# Patient Record
Sex: MALE | Race: ASIAN
[De-identification: names, ages, dates, MRNs, and addresses within clinical notes are randomized per-mention and may not be internally consistent; named-entity substitution may affect disease eponyms.]

---

## 2017-09-07 ENCOUNTER — HOSPITAL ENCOUNTER (INPATIENT)
Dept: HOSPITAL 53 - M ED | Age: 37
LOS: 4 days | Discharge: HOME | DRG: 194 | End: 2017-09-11
Attending: GENERAL PRACTICE | Admitting: INTERNAL MEDICINE
Payer: SELF-PAY

## 2017-09-07 VITALS — DIASTOLIC BLOOD PRESSURE: 93 MMHG | SYSTOLIC BLOOD PRESSURE: 125 MMHG

## 2017-09-07 VITALS — WEIGHT: 168.21 LBS | HEIGHT: 71 IN | BODY MASS INDEX: 23.55 KG/M2

## 2017-09-07 VITALS — SYSTOLIC BLOOD PRESSURE: 133 MMHG | DIASTOLIC BLOOD PRESSURE: 94 MMHG

## 2017-09-07 VITALS — SYSTOLIC BLOOD PRESSURE: 112 MMHG | DIASTOLIC BLOOD PRESSURE: 78 MMHG

## 2017-09-07 DIAGNOSIS — F10.10: ICD-10-CM

## 2017-09-07 DIAGNOSIS — I34.0: ICD-10-CM

## 2017-09-07 DIAGNOSIS — I50.21: Primary | ICD-10-CM

## 2017-09-07 DIAGNOSIS — F17.200: ICD-10-CM

## 2017-09-07 DIAGNOSIS — J18.9: ICD-10-CM

## 2017-09-07 DIAGNOSIS — I47.2: ICD-10-CM

## 2017-09-07 LAB
ADD MANUAL DIFFER: NO
ALBUMIN SERPL BCG-MCNC: 3.3 GM/DL (ref 3.2–5.2)
ALBUMIN/GLOB SERPL: 1.03 {RATIO} (ref 1–1.93)
ALP SERPL-CCNC: 92 U/L (ref 45–117)
ALT SERPL W P-5'-P-CCNC: 62 U/L (ref 12–78)
ANION GAP SERPL CALC-SCNC: 11 MEQ/L (ref 8–16)
AST SERPL-CCNC: 23 U/L (ref 15–37)
BASOPHILS # BLD AUTO: 0 K/MM3 (ref 0–0.2)
BASOPHILS NFR BLD AUTO: 0.4 % (ref 0–1)
BILIRUB CONJ SERPL-MCNC: 0.8 MG/DL (ref 0–0.2)
BILIRUB SERPL-MCNC: 3.1 MG/DL (ref 0.2–1)
BUN SERPL-MCNC: 13 MG/DL (ref 7–18)
CALCIUM SERPL-MCNC: 8.5 MG/DL (ref 8.5–10.1)
CHLORIDE SERPL-SCNC: 108 MEQ/L (ref 98–107)
CO2 SERPL-SCNC: 23 MEQ/L (ref 21–32)
CREAT SERPL-MCNC: 1.15 MG/DL (ref 0.7–1.3)
DIFF SLIDE NUMBER: 234
EOSINOPHIL # BLD AUTO: 0.1 K/MM3 (ref 0–0.5)
EOSINOPHIL NFR BLD AUTO: 0.8 % (ref 0–3)
ERYTHROCYTE [DISTWIDTH] IN BLOOD BY AUTOMATED COUNT: 12.9 % (ref 11.5–14.5)
GFR SERPL CREATININE-BSD FRML MDRD: > 60 ML/MIN/{1.73_M2} (ref 60–?)
GLUCOSE SERPL-MCNC: 118 MG/DL (ref 70–105)
INR PPP: 1.11
LARGE UNSTAINED CELL #: 0.1 K/MM3 (ref 0–0.4)
LARGE UNSTAINED CELL %: 1 % (ref 0–4)
LYMPHOCYTES # BLD AUTO: 1.7 K/MM3 (ref 1.5–4.5)
LYMPHOCYTES NFR BLD AUTO: 14 % (ref 24–44)
MCH RBC QN AUTO: 33.1 PG (ref 27–33)
MCHC RBC AUTO-ENTMCNC: 33.4 G/DL (ref 32–36.5)
MCV RBC AUTO: 99.3 FL (ref 80–96)
MONOCYTES # BLD AUTO: 0.5 K/MM3 (ref 0–0.8)
MONOCYTES NFR BLD AUTO: 4.4 % (ref 0–5)
NEUTROPHILS # BLD AUTO: 9.4 K/MM3 (ref 1.8–7.7)
NEUTROPHILS NFR BLD AUTO: 79.4 % (ref 36–66)
PLATELET # BLD AUTO: 309 K/MM3 (ref 150–450)
POTASSIUM SERPL-SCNC: 4.4 MEQ/L (ref 3.5–5.1)
PROT SERPL-MCNC: 6.5 GM/DL (ref 6.4–8.2)
SODIUM SERPL-SCNC: 142 MEQ/L (ref 136–145)
T4 SERPL-MCNC: 10.6 UG/DL (ref 4.5–12)
WBC # BLD AUTO: 11.8 K/MM3 (ref 4–10)

## 2017-09-07 RX ADMIN — FUROSEMIDE SCH MG: 40 TABLET ORAL at 16:12

## 2017-09-07 RX ADMIN — DEXTROSE MONOHYDRATE SCH MLS/HR: 50 INJECTION, SOLUTION INTRAVENOUS at 16:12

## 2017-09-07 RX ADMIN — CEFTRIAXONE SCH MLS/HR: 1 INJECTION, POWDER, FOR SOLUTION INTRAMUSCULAR; INTRAVENOUS at 18:17

## 2017-09-07 NOTE — REP
Chest two views

 

HISTORY: Cough

 

Comparison: 07/03/2014

 

Patchy density is present in the right lower lobe consistent with atelectasis or

infiltrate.  The left lung is clear.  The heart is normal in size.  The pulmonary

vasculature is normal in appearance.  The bony structure is intact.

 

IMPRESSION:  Right lower lobe atelectasis or infiltrate.

 

 

Signed by

Rigo Talavera MD 09/07/2017 11:40 A

## 2017-09-07 NOTE — ECGEPIP
Stationary ECG Study

                           [facility] - ED

                                       

                                       Test Date:    2017

Pat Name:     FADI PAPPAS              Department:   

Patient ID:   [id number]                 Room:         -

Gender:       M                        Technician:   

:          1980               Requested By: NABOR LALA PA-C

Order Number: [id number]     Reading MD:   Chris Rao

                                 Measurements

Intervals                              Axis          

Rate:         124                      P:            35

[place]:           112                      QRS:          91

QRSD:         102                      T:            -37

QT:           337                                    

QTc:          484                                    

                           Interpretive Statements

SINUS TACHYCARDIA WITH SHORT [place] INTERVAL

LAE

BORDERLINE RIGHT AXIS DEVIATION

SEPTAL MYOCARDIAL INFARCTION, OF INDETERMINATE AGE

NO PRIORS

Electronically Signed On 2017 18:30:52 EDT by Chris Rao

## 2017-09-07 NOTE — HPEPDOC
General


Date of Admission


Sep 7, 2017 at 15:06


Attending Physician:  GAGE ALVAREZ MD


Chief Complaint


The patient is a 37-year-old male admitted with a reason for visit of 

Multifocal Pneumonia.





History of Present Illness


37-year-old male with no significant past medical history, and has not seen a 

primary care physician in the last 10+ years presents to the ER with a chief 

complaint of increased shortness of breath and cough productive of bloody 

sputum. The patient states that his symptoms began 6 weeks, when he noted 

feeling generalized fatigue and short of breath on exertion. However, over the 

last 2 weeks patient also began to notice lower extremity swelling. His 

symptoms culminated over the last 2 days when he developed a cough productive 

of bloody tinged sputum. The patient also states that he has been feeling 

increasingly short of breath when laying flat in bed. During this time, the 

patient denied any fevers, chills, chest pain, palpitations, abdominal pain, 

recent travel, sick contacts, or any nausea/vomiting/diarrhea. However, the 

patient does state that he does drink 1-2 shots of hard liquor and 4-5 beers 

about 4 times a week. In addition, the patient does state that his father has a 

history of congestive heart failure diagnosed at the age of 48. He also has a 

history of cardiac disease on his father's side.





In the ER, a CT of the chest revealed multifocal pneumonia. In addition, 

cardiomegaly could not be ruled out. However, the patient's lab work consisted 

of a BNP of nearly 1500+, and a physical exam suggestive of fluid overload (2+ 

lower extremity pitting edema, and faint bibasilar crackles). The patient will 

be admitted to the hospitalist service for further evaluation and management.





Home Medications


No Active Prescriptions or Reported Meds





Allergies


Coded Allergies:  


     No Known Drug Allergy (Verified  Allergy, Unknown, 9/12/14)





Past Medical History


Medical History


None


Surgical History


Tonsillectomy, hernia repair





Family History


Father diagnosed with congestive heart failure in 2001, at age 48. Grandfather 

also had cardiac disease history in addition to other members on father's side.





Social History


* Smoker:  other (patient states that he has smoked about half a pack to pack a 

day of tobacco for the past 10+ years.)


Alcohol:  other (as noted in HPI.)


Drugs:  denies


Recent Travel/Sick Contacts:  Denies: Recent travel, Recent sick contacts





Review of Symptoms


Other systems


10 point review of systems negative unless otherwise specified in HPI.





Physical Examination


General Exam:  Positive: Alert, Cooperative, No Acute Distress


ENT Exam:  Positive: Atraumatic, Mucous membr. moist/pink


Neck Exam:  Negative: JVD


Chest Exam:  Positive: Rales (faint bibasilar rales noted on auscultation)


Heart Exam:  Positive: Tachycardic, Normal S1, Normal S2


Telemetry:  Positive: Sinus


Abdomen Exam:  Positive: Soft, 


   Negative: Tenderness


Extremity Exam:  Positive: Swelling (2+ pitting edema noted from his lower 

extremities bilaterally.), 


   Negative: Tenderness


Psych Exam:  Positive: Oriented x 3





Vital Signs





Vital Signs








  Date Time  Temp Pulse Resp B/P (MAP) Pulse Ox O2 Delivery O2 Flow Rate FiO2


 


9/7/17 10:56        


 


9/7/17 10:46 97.2 128 19  100 Room Air  











Laboratory Data


Labs 24H


Laboratory Tests 2


9/7/17 11:22: 


White Blood Count 11.8H, Red Blood Count 5.13, Hemoglobin 17.0, Hematocrit 51.0

, Mean Corpuscular Volume 99.3H, Mean Corpuscular Hemoglobin 33.1H, Mean 

Corpuscular Hemoglobin Concent 33.4, Red Cell Distribution Width 12.9, Platelet 

Count 309, Neutrophils (%) (Auto) 79.4H, Lymphocytes (%) (Auto) 14.0L, 

Monocytes (%) (Auto) 4.4, Eosinophils (%) (Auto) 0.8, Basophils (%) (Auto) 0.4, 

Neutrophils # (Auto) 9.4H, Lymphocytes # (Auto) 1.7, Monocytes # (Auto) 0.5, 

Eosinophils # (Auto) 0.1, Basophils # (Auto) 0.0, Large Unclassified Cells % 1.0

, Large Unclassified Cells # 0.1, Prothrombin Time 14.5, Prothromb Time 

International Ratio 1.11, Anion Gap 11, Glomerular Filtration Rate > 60.0, 

Lactic Acid Level 2.5*H, Calcium Level 8.5, Aspartate Amino Transf (AST/SGOT) 23

, Alanine Aminotransferase (ALT/SGPT) 62, Alkaline Phosphatase 92, Total 

Bilirubin 3.1H, Direct Bilirubin 0.8H, Total Creatine Kinase 76, Creatine 

Kinase MB 2.1, Creatine Kinase MB Relative Index 2.76, Troponin I 0.02, B-Type 

Natriuretic Peptide 1480H, Total Protein 6.5, Albumin 3.3, Albumin/Globulin 

Ratio 1.03, Thyroid Stimulating Hormone (TSH) 1.640, Thyroxine (T4) 10.6


9/7/17 13:53: 


Urine Appearance CLEAR, Urine Color YELLOW, Urine pH 5.0, Urine Specific 

Gravity 1.044, Urine Protein 1+H, Urine Glucose (UA) NEGATIVE, Urine Ketones 

NEGATIVE, Urine Urobilinogen 0.2, Urine Bilirubin NEGATIVE, Urine Leukocyte 

Esterase NEGATIVE, Urine Blood NEGATIVE, Urine Nitrite NEGATIVE, Urine WBC (Auto

) 0, Urine RBC (Auto) 0, Urine Hyaline Casts (Auto) 0, Urine Bacteria (Auto) 

NEGATIVE, Urine Squamous Epithelial Cells 0, Urine Mucus (Auto) SMALL, Urine 

Sperm (Auto) 


9/7/17 15:26: 


CBC/BMP


Laboratory Tests


9/7/17 11:22








Red Blood Count 5.13, Mean Corpuscular Volume 99.3 H, Mean Corpuscular 

Hemoglobin 33.1 H, Mean Corpuscular Hemoglobin Concent 33.4, Red Cell 

Distribution Width 12.9, Neutrophils (%) (Auto) 79.4 H, Lymphocytes (%) (Auto) 

14.0 L, Monocytes (%) (Auto) 4.4, Eosinophils (%) (Auto) 0.8, Basophils (%) (

Auto) 0.4, Neutrophils # (Auto) 9.4 H, Lymphocytes # (Auto) 1.7, Monocytes # (

Auto) 0.5, Eosinophils # (Auto) 0.1, Basophils # (Auto) 0.0


Microbiology





Microbiology


9/7/17 Blood Culture, Received


         Pending





Plan / VTE


VTE Prophylaxis Ordered?:  Yes





Plan


Plan


Multifocal Community-acquired pneumonia


CTA of the chest noted


Sputum cultures, MRSA screen, respiratory panel, urine Legionella/strep 

screening ordered


Blood cultures ordered


We will start the patient on Rocephin and azithromycin


We will continue to monitor the patient's clinical status





Possible underlying CHF


The patient does have evidence of decompensated congestive heart failure on 

physical exam, as well as his clinical presentation.


The patient does have risk factors which include family history of early onset 

cardiac disease, alcohol abuse, and history of tobacco use


I will start him on a dose of Lasix 40 mg daily, which can be uptitrated as 

tolerated


EKG in the ER reveals sinus tachycardia and nonspecific ST changes


Initial troponin negative, we will serially trend


2-D echocardiogram ordered


We will continue to monitor the patient on telemetry for now





Alcohol use


The patient states that he drinks 1-2 shots of hard liquor, with 4-5 beers most 

days of the week.


His last alcoholic beverage was 24 hours ago, the patient denies any signs/

symptoms or history of alcohol withdrawal


We will add when necessary Serax





DVT prophylaxis


Lovenox





The patient will be admitted under the service of Dr. Alvarez, who will begin 

to follow the patient on 9/8/17 at 7 AM.











SIMONE SAWANT MD Sep 7, 2017 16:00

## 2017-09-07 NOTE — REP
Clinical:  Shortness of breath .

 

Technique:  Axial contrast enhanced images from the thoracic inlet to the upper

abdomen using 100 ml Isovue 370 intravenous contrast material with multiplanar

re-formations.

 

Findings:  Satisfactory enhancement of the pulmonary vasculature is achieved and

no filling defects are identified to suggest pulmonary embolus.  Further

evaluation of the mediastinum cannot exclude cardiomegaly which may warrant

further investigation.  The bilateral lung fields demonstrate bilateral scattered

alveolar infiltrates suggesting multifocal pneumonia.  Associated mediastinal and

hilar adenopathy is identified and likely reactive.  Tracheobronchial tree is

patent.  No pleural effusion or pneumothorax.  Surrounding musculoskeletal

structures are intact.

 

Impression:

1.  No evidence for pulmonary embolus.

2.  Moderate bilateral diffuse alveolar infiltrates and adenopathy suggest

multifocal pneumonia and follow up to resolution is recommended.

3.  Cannot exclude cardiomegaly for which consultation may be advised.

 

 

Signed by

Pasquale Friedman MD 09/07/2017 01:35 P

## 2017-09-08 VITALS — DIASTOLIC BLOOD PRESSURE: 89 MMHG | SYSTOLIC BLOOD PRESSURE: 131 MMHG

## 2017-09-08 VITALS — DIASTOLIC BLOOD PRESSURE: 73 MMHG | SYSTOLIC BLOOD PRESSURE: 123 MMHG

## 2017-09-08 VITALS — DIASTOLIC BLOOD PRESSURE: 76 MMHG | SYSTOLIC BLOOD PRESSURE: 125 MMHG

## 2017-09-08 VITALS — DIASTOLIC BLOOD PRESSURE: 94 MMHG | SYSTOLIC BLOOD PRESSURE: 132 MMHG

## 2017-09-08 VITALS — DIASTOLIC BLOOD PRESSURE: 83 MMHG | HEART RATE: 113 BPM | SYSTOLIC BLOOD PRESSURE: 130 MMHG

## 2017-09-08 VITALS — SYSTOLIC BLOOD PRESSURE: 123 MMHG | DIASTOLIC BLOOD PRESSURE: 73 MMHG

## 2017-09-08 VITALS — DIASTOLIC BLOOD PRESSURE: 87 MMHG | SYSTOLIC BLOOD PRESSURE: 133 MMHG

## 2017-09-08 VITALS — SYSTOLIC BLOOD PRESSURE: 124 MMHG | DIASTOLIC BLOOD PRESSURE: 76 MMHG

## 2017-09-08 VITALS — DIASTOLIC BLOOD PRESSURE: 83 MMHG | SYSTOLIC BLOOD PRESSURE: 130 MMHG

## 2017-09-08 LAB
ALBUMIN SERPL BCG-MCNC: 3 GM/DL (ref 3.2–5.2)
ALBUMIN/GLOB SERPL: 0.94 {RATIO} (ref 1–1.93)
ALP SERPL-CCNC: 76 U/L (ref 45–117)
ALT SERPL W P-5'-P-CCNC: 47 U/L (ref 12–78)
ANION GAP SERPL CALC-SCNC: 10 MEQ/L (ref 8–16)
ANION GAP SERPL CALC-SCNC: 13 MEQ/L (ref 8–16)
AST SERPL-CCNC: 15 U/L (ref 15–37)
BASOPHILS # BLD AUTO: 0.1 K/MM3 (ref 0–0.2)
BASOPHILS NFR BLD AUTO: 0.6 % (ref 0–1)
BILIRUB SERPL-MCNC: 2.4 MG/DL (ref 0.2–1)
BUN SERPL-MCNC: 14 MG/DL (ref 7–18)
BUN SERPL-MCNC: 17 MG/DL (ref 7–18)
CALCIUM SERPL-MCNC: 8.4 MG/DL (ref 8.5–10.1)
CALCIUM SERPL-MCNC: 8.7 MG/DL (ref 8.5–10.1)
CHLORIDE SERPL-SCNC: 103 MEQ/L (ref 98–107)
CHLORIDE SERPL-SCNC: 107 MEQ/L (ref 98–107)
CHOLEST SERPL-MCNC: 165 MG/DL (ref ?–200)
CO2 SERPL-SCNC: 21 MEQ/L (ref 21–32)
CO2 SERPL-SCNC: 24 MEQ/L (ref 21–32)
CREAT SERPL-MCNC: 1.15 MG/DL (ref 0.7–1.3)
CREAT SERPL-MCNC: 1.26 MG/DL (ref 0.7–1.3)
EOSINOPHIL # BLD AUTO: 0.1 K/MM3 (ref 0–0.5)
EOSINOPHIL NFR BLD AUTO: 0.9 % (ref 0–3)
ERYTHROCYTE [DISTWIDTH] IN BLOOD BY AUTOMATED COUNT: 13.2 % (ref 11.5–14.5)
GFR SERPL CREATININE-BSD FRML MDRD: > 60 ML/MIN/{1.73_M2} (ref 60–?)
GFR SERPL CREATININE-BSD FRML MDRD: > 60 ML/MIN/{1.73_M2} (ref 60–?)
GLUCOSE SERPL-MCNC: 162 MG/DL (ref 70–105)
GLUCOSE SERPL-MCNC: 93 MG/DL (ref 70–105)
LARGE UNSTAINED CELL #: 0.1 K/MM3 (ref 0–0.4)
LARGE UNSTAINED CELL %: 1.3 % (ref 0–4)
LYMPHOCYTES # BLD AUTO: 2.9 K/MM3 (ref 1.5–4.5)
LYMPHOCYTES NFR BLD AUTO: 25.7 % (ref 24–44)
MAGNESIUM SERPL-MCNC: 1.9 MG/DL (ref 1.8–2.4)
MAGNESIUM SERPL-MCNC: 2 MG/DL (ref 1.8–2.4)
MAGNESIUM SERPL-MCNC: 2 MG/DL (ref 1.8–2.4)
MCH RBC QN AUTO: 32.3 PG (ref 27–33)
MCHC RBC AUTO-ENTMCNC: 32.2 G/DL (ref 32–36.5)
MCV RBC AUTO: 100.4 FL (ref 80–96)
MONOCYTES # BLD AUTO: 0.6 K/MM3 (ref 0–0.8)
MONOCYTES NFR BLD AUTO: 5.9 % (ref 0–5)
NEUTROPHILS # BLD AUTO: 7 K/MM3 (ref 1.8–7.7)
NEUTROPHILS NFR BLD AUTO: 65.6 % (ref 36–66)
PLATELET # BLD AUTO: 269 K/MM3 (ref 150–450)
POTASSIUM SERPL-SCNC: 4.3 MEQ/L (ref 3.5–5.1)
POTASSIUM SERPL-SCNC: 4.5 MEQ/L (ref 3.5–5.1)
PROT SERPL-MCNC: 6.2 GM/DL (ref 6.4–8.2)
SODIUM SERPL-SCNC: 137 MEQ/L (ref 136–145)
SODIUM SERPL-SCNC: 141 MEQ/L (ref 136–145)
T3RU NFR SERPL: 40 % (ref 33–40)
T4 SERPL-MCNC: 8.2 UG/DL (ref 4.5–12)
TRIGL SERPL-MCNC: 106 MG/DL (ref ?–150)
WBC # BLD AUTO: 10.6 K/MM3 (ref 4–10)

## 2017-09-08 RX ADMIN — CEFTRIAXONE SCH MLS/HR: 1 INJECTION, POWDER, FOR SOLUTION INTRAMUSCULAR; INTRAVENOUS at 17:10

## 2017-09-08 RX ADMIN — ENOXAPARIN SODIUM SCH MG: 40 INJECTION SUBCUTANEOUS at 09:15

## 2017-09-08 RX ADMIN — SODIUM CHLORIDE, PRESERVATIVE FREE SCH ML: 5 INJECTION INTRAVENOUS at 11:28

## 2017-09-08 RX ADMIN — FUROSEMIDE SCH MG: 10 INJECTION, SOLUTION INTRAMUSCULAR; INTRAVENOUS at 15:00

## 2017-09-08 RX ADMIN — ALBUTEROL SULFATE PRN MG: 2.5 SOLUTION RESPIRATORY (INHALATION) at 13:54

## 2017-09-08 RX ADMIN — CEFTRIAXONE SCH MLS/HR: 1 INJECTION, POWDER, FOR SOLUTION INTRAMUSCULAR; INTRAVENOUS at 05:06

## 2017-09-08 RX ADMIN — FUROSEMIDE SCH MG: 10 INJECTION, SOLUTION INTRAMUSCULAR; INTRAVENOUS at 20:49

## 2017-09-08 RX ADMIN — ALBUTEROL SULFATE PRN MG: 2.5 SOLUTION RESPIRATORY (INHALATION) at 19:33

## 2017-09-08 RX ADMIN — DEXTROSE MONOHYDRATE SCH MLS/HR: 50 INJECTION, SOLUTION INTRAVENOUS at 16:15

## 2017-09-08 RX ADMIN — FUROSEMIDE SCH MG: 40 TABLET ORAL at 09:13

## 2017-09-08 NOTE — REP
Clinical:  Chest pain

 

Comparison:  09/07/2017 .

 

Findings:

The mediastinum and cardiac silhouette are stable and within normal limits for

portable technique.  The lung fields are relatively stable and the previously

noted subtle alveolar infiltrates cannot be excluded.  No effusion or

pneumothorax.  Skeletal structures are intact.

 

Impression:

Subtle alveolar infiltrates primarily within the right lower lobe cannot be

excluded.

 

 

Signed by

Pasquale Freidman MD 09/08/2017 11:33 A

## 2017-09-08 NOTE — ECGEPIP
Stationary ECG Study

                              [facility]

                                       

                                       Test Date:    2017

Pat Name:     FADI PAPPAS              Department:   

Patient ID:   [id number]                 Room:         [place] [address]

Gender:       M                        Technician:   ADITYA

:          1980               Requested By: GAGE BEJARANO

Order Number: [id number]     Reading MD:   Daniel Real

                                 Measurements

Intervals                              Axis          

Rate:         113                      P:            48

ND:           120                      QRS:          100

QRSD:         102                      T:            -83

QT:           330                                    

QTc:          453                                    

                           Interpretive Statements

Sinus tachycardia

Biatrial enlargement

Low QRS complex voltage in the limb leads

Anteroseptal MI, age indeterminate

Nonspecific ST-T wave abnormalities

No significant change when compared to prior tracing of earlier this date

Electronically Signed On 2017 16:48:51 EDT by Daniel Real

## 2017-09-08 NOTE — ECGEPIP
Stationary ECG Study

                              [facility]

                                       

                                       Test Date:    2017

Pat Name:     FADI PAPPAS              Department:   

Patient ID:   [id number]                 Room:         [place] [address]

Gender:       M                        Technician:   

:          1980               Requested By: SIMONE SAWANT 

Order Number: [id number]     Reading MD:   Daniel Real

                                 Measurements

Intervals                              Axis          

Rate:         104                      P:            52

DC:           147                      QRS:          99

QRSD:         102                      T:            -86

QT:           362                                    

QTc:          477                                    

                           Interpretive Statements

Sinus tachycardia with PVCs

Left atrial enlargement

Low QRS complex voltage in the limb leads

Delayed anterior R wave progression consistent with prior AWMI

Nonspecific ST-T wave abnormalities

Compared to prior tracing of 2017, PVCs are new

 

Electronically Signed On 2017 16:42:47 EDT by Daniel Real

## 2017-09-09 VITALS — SYSTOLIC BLOOD PRESSURE: 111 MMHG | DIASTOLIC BLOOD PRESSURE: 79 MMHG

## 2017-09-09 VITALS — DIASTOLIC BLOOD PRESSURE: 82 MMHG | SYSTOLIC BLOOD PRESSURE: 112 MMHG

## 2017-09-09 VITALS — SYSTOLIC BLOOD PRESSURE: 118 MMHG | DIASTOLIC BLOOD PRESSURE: 71 MMHG

## 2017-09-09 VITALS — DIASTOLIC BLOOD PRESSURE: 88 MMHG | SYSTOLIC BLOOD PRESSURE: 118 MMHG

## 2017-09-09 VITALS — DIASTOLIC BLOOD PRESSURE: 88 MMHG | SYSTOLIC BLOOD PRESSURE: 126 MMHG

## 2017-09-09 VITALS — DIASTOLIC BLOOD PRESSURE: 64 MMHG | SYSTOLIC BLOOD PRESSURE: 130 MMHG

## 2017-09-09 VITALS — SYSTOLIC BLOOD PRESSURE: 106 MMHG | DIASTOLIC BLOOD PRESSURE: 67 MMHG

## 2017-09-09 LAB
ALBUMIN SERPL BCG-MCNC: 2.8 GM/DL (ref 3.2–5.2)
ALBUMIN/GLOB SERPL: 0.85 {RATIO} (ref 1–1.93)
ALP SERPL-CCNC: 70 U/L (ref 45–117)
ALT SERPL W P-5'-P-CCNC: 43 U/L (ref 12–78)
ANION GAP SERPL CALC-SCNC: 10 MEQ/L (ref 8–16)
ANION GAP SERPL CALC-SCNC: 12 MEQ/L (ref 8–16)
AST SERPL-CCNC: 20 U/L (ref 15–37)
BASOPHILS # BLD AUTO: 0.1 K/MM3 (ref 0–0.2)
BASOPHILS NFR BLD AUTO: 1.1 % (ref 0–1)
BILIRUB SERPL-MCNC: 1.5 MG/DL (ref 0.2–1)
BUN SERPL-MCNC: 15 MG/DL (ref 7–18)
BUN SERPL-MCNC: 16 MG/DL (ref 7–18)
CALCIUM SERPL-MCNC: 8.6 MG/DL (ref 8.5–10.1)
CALCIUM SERPL-MCNC: 8.8 MG/DL (ref 8.5–10.1)
CHLORIDE SERPL-SCNC: 104 MEQ/L (ref 98–107)
CHLORIDE SERPL-SCNC: 106 MEQ/L (ref 98–107)
CO2 SERPL-SCNC: 24 MEQ/L (ref 21–32)
CO2 SERPL-SCNC: 25 MEQ/L (ref 21–32)
CREAT SERPL-MCNC: 1.02 MG/DL (ref 0.7–1.3)
CREAT SERPL-MCNC: 1.04 MG/DL (ref 0.7–1.3)
EOSINOPHIL # BLD AUTO: 0.1 K/MM3 (ref 0–0.5)
EOSINOPHIL NFR BLD AUTO: 0.9 % (ref 0–3)
ERYTHROCYTE [DISTWIDTH] IN BLOOD BY AUTOMATED COUNT: 13.2 % (ref 11.5–14.5)
GFR SERPL CREATININE-BSD FRML MDRD: > 60 ML/MIN/{1.73_M2} (ref 60–?)
GFR SERPL CREATININE-BSD FRML MDRD: > 60 ML/MIN/{1.73_M2} (ref 60–?)
GGT SERPL-CCNC: 182 U/L (ref 15–85)
GLUCOSE SERPL-MCNC: 101 MG/DL (ref 70–105)
GLUCOSE SERPL-MCNC: 101 MG/DL (ref 70–105)
LARGE UNSTAINED CELL #: 0.2 K/MM3 (ref 0–0.4)
LARGE UNSTAINED CELL %: 1.8 % (ref 0–4)
LYMPHOCYTES # BLD AUTO: 2.8 K/MM3 (ref 1.5–4.5)
LYMPHOCYTES NFR BLD AUTO: 26.9 % (ref 24–44)
MAGNESIUM SERPL-MCNC: 1.9 MG/DL (ref 1.8–2.4)
MAGNESIUM SERPL-MCNC: 2 MG/DL (ref 1.8–2.4)
MCH RBC QN AUTO: 32.6 PG (ref 27–33)
MCHC RBC AUTO-ENTMCNC: 33.2 G/DL (ref 32–36.5)
MCV RBC AUTO: 98.2 FL (ref 80–96)
MONOCYTES # BLD AUTO: 0.7 K/MM3 (ref 0–0.8)
MONOCYTES NFR BLD AUTO: 6.8 % (ref 0–5)
NEUTROPHILS # BLD AUTO: 6 K/MM3 (ref 1.8–7.7)
NEUTROPHILS NFR BLD AUTO: 62.7 % (ref 36–66)
PLATELET # BLD AUTO: 239 K/MM3 (ref 150–450)
POTASSIUM SERPL-SCNC: 3.7 MEQ/L (ref 3.5–5.1)
POTASSIUM SERPL-SCNC: 4.7 MEQ/L (ref 3.5–5.1)
PROT SERPL-MCNC: 6.1 GM/DL (ref 6.4–8.2)
SODIUM SERPL-SCNC: 140 MEQ/L (ref 136–145)
SODIUM SERPL-SCNC: 141 MEQ/L (ref 136–145)
WBC # BLD AUTO: 9.6 K/MM3 (ref 4–10)

## 2017-09-09 RX ADMIN — ALBUTEROL SULFATE PRN MG: 2.5 SOLUTION RESPIRATORY (INHALATION) at 07:26

## 2017-09-09 RX ADMIN — SACUBITRIL AND VALSARTAN SCH TAB: 24; 26 TABLET, FILM COATED ORAL at 21:12

## 2017-09-09 RX ADMIN — SODIUM CHLORIDE, PRESERVATIVE FREE SCH ML: 5 INJECTION INTRAVENOUS at 00:04

## 2017-09-09 RX ADMIN — FUROSEMIDE SCH MG: 10 INJECTION, SOLUTION INTRAMUSCULAR; INTRAVENOUS at 14:46

## 2017-09-09 RX ADMIN — CEFTRIAXONE SCH MLS/HR: 1 INJECTION, POWDER, FOR SOLUTION INTRAMUSCULAR; INTRAVENOUS at 17:10

## 2017-09-09 RX ADMIN — ENOXAPARIN SODIUM SCH MG: 40 INJECTION SUBCUTANEOUS at 08:40

## 2017-09-09 RX ADMIN — ALBUTEROL SULFATE PRN MG: 2.5 SOLUTION RESPIRATORY (INHALATION) at 03:46

## 2017-09-09 RX ADMIN — SODIUM CHLORIDE, PRESERVATIVE FREE SCH ML: 5 INJECTION INTRAVENOUS at 21:12

## 2017-09-09 RX ADMIN — ALBUTEROL SULFATE PRN MG: 2.5 SOLUTION RESPIRATORY (INHALATION) at 00:13

## 2017-09-09 RX ADMIN — SODIUM CHLORIDE, PRESERVATIVE FREE SCH ML: 5 INJECTION INTRAVENOUS at 05:30

## 2017-09-09 RX ADMIN — ALBUTEROL SULFATE PRN MG: 2.5 SOLUTION RESPIRATORY (INHALATION) at 14:38

## 2017-09-09 RX ADMIN — ALBUTEROL SULFATE PRN MG: 2.5 SOLUTION RESPIRATORY (INHALATION) at 19:47

## 2017-09-09 RX ADMIN — CEFTRIAXONE SCH MLS/HR: 1 INJECTION, POWDER, FOR SOLUTION INTRAMUSCULAR; INTRAVENOUS at 04:49

## 2017-09-09 RX ADMIN — Medication SCH MG: at 08:40

## 2017-09-09 RX ADMIN — ALBUTEROL SULFATE PRN MG: 2.5 SOLUTION RESPIRATORY (INHALATION) at 23:52

## 2017-09-09 RX ADMIN — SACUBITRIL AND VALSARTAN SCH TAB: 24; 26 TABLET, FILM COATED ORAL at 14:55

## 2017-09-09 RX ADMIN — SPIRONOLACTONE SCH MG: 25 TABLET, FILM COATED ORAL at 12:45

## 2017-09-09 RX ADMIN — FUROSEMIDE SCH MG: 10 INJECTION, SOLUTION INTRAMUSCULAR; INTRAVENOUS at 08:37

## 2017-09-09 RX ADMIN — ALBUTEROL SULFATE PRN MG: 2.5 SOLUTION RESPIRATORY (INHALATION) at 11:07

## 2017-09-09 RX ADMIN — FOLIC ACID SCH MG: 1 TABLET ORAL at 08:40

## 2017-09-09 RX ADMIN — MULTIPLE VITAMINS W/ MINERALS TAB SCH TAB: TAB at 08:40

## 2017-09-09 RX ADMIN — FUROSEMIDE SCH MG: 10 INJECTION, SOLUTION INTRAMUSCULAR; INTRAVENOUS at 03:00

## 2017-09-09 RX ADMIN — SODIUM CHLORIDE, PRESERVATIVE FREE SCH ML: 5 INJECTION INTRAVENOUS at 14:56

## 2017-09-09 NOTE — ECHO
DATE OF PROCEDURE:  09/08/2017

 

YOB: 1980

AGE:  37

 

REFERRING PROVIDER:  Dr. Aleshia Alvarez

PATIENT LOCATION:  Room 3211

REASON FOR THE ECHOCARDIOGRAM:  Shortness of breath, nonsustained ventricular

tachycardia.

 

2D MEASUREMENTS:

IVS:  0.8 cm

LV:  6.6 cm

LVPW:  0.6 cm

LA:  4.6 cm

Aorta:  2.9 cm

IVC:  2.5 cm

RV:  2.6 cm

Ascending aorta:  2.6 cm

 

DOPPLER MEASUREMENTS:

Mitral E:  0.59

Mitral A:  0.37

Maximum tricuspid valve velocity:  3.1 m/s

 

2D COMMENTS:

1. Moderately enlarged left ventricle with severe diffuse hypokinesis and a

severely depressed global left ventricular systolic function. Left ventricular

systolic ejection fraction is estimated at 10-20%.

 

2. Mildly dilated left atrium. The right atrium may be mildly enlarged. Normal

right ventricle.

 

3. The atrial septum appeared to be normal without evidence of defect or shunt.

 

4. Normal aortic root.

 

5. Trace pericardial effusion noted, no evidence of cardiac tamponade.

 

6. Normal mitral valve. Minimally calcified aortic valve with normal leaflet

excursion. Normal tricuspid valve and pulmonic valve. The proximal pulmonary

artery branches appear to be normal.

 

7. The inferior vena cava was dilated, central venous pressure is most likely

elevated.

 

DOPPLER:  

It detects mild to moderate mitral regurgitation, moderate tricuspid

regurgitation and trace pulmonic regurgitation. The calculated pulmonary artery

systolic pressure varied between 40 to 50 mmHg.

 

IMPRESSION:

1. Severe left ventricular systolic dysfunction with severe diffuse hypokinesis

and a moderately enlarged left ventricle.

 

2. Aortic valve sclerosis without stenosis or aortic regurgitation.

 

3. Mild to moderate mitral regurgitation with mildly enlarged left atrium.

 

4. Moderate tricuspid regurgitation with moderate pulmonary hypertension and

probably mildly enlarged right atrium.

 

5. There are findings consistent with elevated central venous pressure.

 

6. The case was discussed with his primary hospitalist.

SALVADOR

## 2017-09-09 NOTE — REP
Clinical:  Shortness of breath.

 

Comparison:  09/08/2017.

 

Findings:  Mediastinum and cardiac silhouette are within normal limits.  A subtle

right lower lobe infiltrate cannot be excluded as well as subtle alveolar

infiltrate involving the right upper lung zone and left perihilar region.

Clinical correlation is recommended.  No effusion.  No pneumothorax.  Skeletal

structures intact.

 

Impression:

Subtle scattered opacities suggesting multifocal infiltrates similar to prior

examination.

 

 

Signed by

Pasquale Friedman MD 09/09/2017 08:21 A

## 2017-09-09 NOTE — CR
DATE OF CONSULTATION: 09/09/2017

 

REFERRING PROVIDER: Dr. Aleshia Alvarez.

 

REASON FOR CONSULTATION: Cardiomyopathy.

 

HISTORY OF PRESENT ILLNESS:

A 37-year-old  male was admitted on 09/70/2017 with increasing 
shortness

of breath, cough and pedal edema. He was being treated for pneumonia, he was

found to have bilateral pulmonary infiltrates. He was being monitored in the

progressive care unit (PCU) on intravenous (IV) antibiotics and he was found on

telemetry to have nonsustained ventricular tachycardia. He underwent an

echocardiogram and this was done this morning. It revealed a severely depressed

global left ventricular systolic function. Cardiology consult was called.

 

When I saw Mr. Seun Cobb, he was sitting up in bed in no acute 
distress

at rest. He has been having shortness of breath for the past four weeks, and 
this

has increased in severity and he had developed bilateral pedal edema, orthopnea.

He denies any palpitations but does complain that his heart goes fast. He denies

any fever or chills. He has no nausea, vomiting, diarrhea, melena or 
hematemesis.

He stated that his pedal edema has improved significantly and almost gone. His

cough also has improved significantly, and he is able to sleep better. He thinks

that he his having much less orthopnea. Since hospitalization, he has been on 
IV Lasix. 

He denies any nausea, vomiting, diarrhea, melena or hematemesis. 

He has no dysuria, polyuria, or hematuria. He has no focal manifestation.

 

There is no prior history of hypertension, hyperlipidemia, diabetes mellitus,

kidney disease, thyroid disorders. There is no history of coronary artery

disease, prior history of cardiomyopathy, prior history of pneumonia, 
significant

valvular heart disease, atrial fibrillation, sudden cardiac death.

 

PAST MEDICAL HISTORY:

Positive for tonsillectomy and hernia repair many years ago; otherwise,

unremarkable.

 

FAMILY HISTORY:

Positive for heart disease. His father was diagnosed in his 30s with congestive

heart failure and was found to have a weak heart, but on treatment, his heart

function has been lately normal.

 

SOCIAL HISTORY:

The patient lives with his wife and he has little children who live in the area.

Prior to coming to the hospital, he was smoking a half pack a day of cigarettes,

and he thinks he was drinking more than needed. He denies any illicit drugs.

ALLERGIES: No known drug allergies.

 

CURRENT MEDICATIONS:

- multivitamin one tablet by mouth daily

- thiamine 100 mg by mouth daily

- folic acid 1 mg by mouth daily

- Lasix 40 mg IV every six hours

- nitroglycerin sublingual as needed for chest pain

- formoterol nebulizer 2.5 mg every four hours as needed for shortness of breath

and wheezing

- Lovenox 40 mg subcutaneous daily

- ceftriaxone 1 gram IV every 12 hours

- Serax 15 mg every eight hours as needed for anxiety and withdrawal

- Tylenol 650 mg as needed for mild pain or fever

- ondansetron 4 mg IV every six hours as needed for nausea or vomiting

 

PHYSICAL EXAMINATION:

The patient is alert and oriented in no acute distress at rest, and his last

vital signs when I saw him revealed a blood pressure of 118/88 with a pulse of

123, respiratory rate 18, and his maximum temperature is 96.8 degrees Fahrenheit

with an oxygen saturation at 98% on room air. On 09/802017, he was in a positive

fluid balance of 785 mL, and for 09/07/2017, he was in a negative fluid balance

of 1.4 liters. His weight on admission was 82.9 kg, and today is 78.3 kg.

HEENT: Head is normocephalic and conjunctivae are pink. Mucous membranes are

moist.

NECK: Supple. I could not appreciate any jugular venous distention (JVD).

LUNGS:  Do not reveal any wheezing or crackles.

HEART: Revealed normal S1, S2 without gallops. Mildly tachycardic. The body mass

index (BMI) is displaced inferiorly and laterally. There is no rub. There is a

systolic murmur grade 1-2 over 6 at the lower left sternal border and at the 
apex

without any significant radiation.

ABDOMEN: Unremarkable.

EXTREMITIES: Reveal no pedal edema.

NEUROLOGIC: Grossly is negative for focal deficit.

 

LABORATORY DATA:

CBC done today revealed a WBC of 9.6, hemoglobin 15.1, hematocrit 45.6, and

platelets 239,000. On admission, WBC was 11.8.

BMP done today revealed a sodium of 141, potassium 3.7, chloride 106, CO2 25, 
BUN

16, creatinine 1.04, GFR more than 60, fasting glucose 101, calcium 8.6,

magnesium 1.9.

Liver enzymes reveal a total bilirubin of 1.5, AST 20, ALT 42, alkaline

phosphatase 70, , total protein 6.1, albumin 2.8.

Serum BNP on admission was 1480.

Serum troponin I has been negative at 1.02.

Thyroid function test on 09/702/107, revealed a TSH of 1.64.

Serum lactic acid on admission was 2.5. Serum lactic acid done on 09/08/2017, is

1.7.

Lipid profile on 09/08/2017, revealed a total cholesterol of 165, triglycerides

106, .8, HDL 39, and total cholesterol/HDL ratio of 4.2.

PT on admission was 14.5 with an INR of 1.11.

Urinalysis revealed +1 protein, otherwise unremarkable.

 

Electrocardiogram on admission revealed normal sinus rhythm, tachycardic at 124

beats per minute, Q wave in leads V1 and V2, borderline right axis deviation.

Nonspecific ST-T abnormalities and probable left ventricular hypertrophy (LVH).

 

Electrocardiogram on 09/08/2017, at 7:51:41 revealed sinus tachycardia at 104

beats per minute and isolated PVCs, left atrial abnormalities, borderline

low-voltage QRS complexes in the limb leads, poor R-wave progression, 
nonspecific

ST-T abnormalities, but borderline right axis deviation. No remarkable changes

but slower heart rate.

 

Electrocardiogram done on 909/08/2017, at 11:02:48, revealed also sinus

tachycardia at 113 beats per minute, left atrial enlargement, probable right

atrial enlargement, borderline low voltage QRS complexes in the limb leads, 
right

axis deviation, poor R-wave progression versus prior septal infarct, nonspecific

S1, S2 abnormalities. No significant changes from last tracing.

 

Telemetry strips were reviewed and revealed sinus rhythm, tachycardic with two

runs of ventricular tachycardia of 6 and 8 beats.

 

Chest x-ray on admission revealed right lower lobe atelectasis versus

infiltrates, borderline cardiomegaly.

 

CT angiogram done on 09/07/2017, revealed no evidence of pulmonary embolism, but

moderate bilateral diffuse alveolar infiltrates suggestive of multifocal

pneumonia and possible cardiomegaly.

 

Chest x-ray done on 09/08/2017, revealed some alveolar infiltrates in the right

lower lobe.

 

Chest x-ray done on 09/09/2017, revealed a possible subtle scattered opacities

suggestive of multifocal infiltrates similar to prior examinations.

 

IMPRESSION:

1. Decompensated congestive heart failure secondary to left ventricular systolic

dysfunction, severe, due probably to primary cardiomyopathy. Underlying coronary

artery disease etiology will need to be ruled out. The patient seems to be 
stable

and well compensated at this present time. This is being diagnosed now and

previously not on any medication. Case was discussed with the hospitalist and he

will be started on treatment. He was started on carvedilol, spironolactone, and

Entresto.  His diuretic will be decreased, the furosemide. He has not been

receiving it, and the last time was probably yesterday. He will be discharged

with a life vest and this was discussed with him. I am thinking about proceeding

with a cardiac catheterization and also this was discussed with him. I will

monitor him along with you while in the hospital and I will see him upon

discharge at the office. This also was discussed with his sister, his healthcare

proxy.

2. Pneumonia, and this is being addressed.

3. History of smoking. The patient stated he will no longer smoke. He also 
stated

he will stop drinking alcohol.

4. Nonsustained ventricular tachycardia, he will be monitored, and if he

continues to have those episodes, he will be started on amiodarone. The plan is

to discharge him on a life vest. It is most likely related to his underlying

cardiomyopathy.

 

It was a pleasure to participate in the care of . Seun Cobb for his

underlying cardiac condition. He appears to be stable from a cardiac point of

view. Please do not hesitate to call if any questions.

SALVADOR

## 2017-09-09 NOTE — IPN
DATE:  09/09/2017

 

Yesterday, the patient complained of chest tightness and diaphoresis when the

azithromycin was being given. There was no wheezing, no rales, no rigors, fever,

chills, hypotension. Telemetry was unremarkable. Remains in sinus tachycardia.

Azithromycin was discontinued. This morning, the patient denies any complaints 
of

chest pain, shortness of breath, palpitations, lightheadedness. Overnight,

telemetry again noted 13 beats of nonsustained ventricular tachycardia (v tach).

The patient was asymptomatic. Blood pressure was well maintained with systolic

pressures of 120s to 130s. The patient admits to having a family history of

congestive heart failure in his father who was diagnosed in his 40s. He 
currently

denies any fever or chills overnight. No productive cough. Shortness of breath

has improved. Lower extremity has improved.

 

Vital Signs:  Temperature 96.9, pulse 114, sinus tachycardia. Respiratory rate

18. Blood pressure 126/88, 96% on room air.

Generally, the patient is awake, alert, oriented times three, answering 
questions

appropriately.

He is anicteric. No jaundice. No jugular venous distention. No thyromegaly. No

cervical lymphadenopathy. No neck rigidity.

Moist mucous membranes. Tongue is midline.

No use of respiratory accessory muscles. Able to speak in full sentences.

Heart:  S1, S2, sinus tachycardia. No murmurs, rubs or gallops.

Lungs are diminished. Fine crackles at the bases.

Abdomen is soft, nontender, nondistended. Positive bowel sounds times four

quadrants. No hepatosplenomegaly, rebound, guarding. No signs of spider

angiomata.

No palmar erythema.

Extremities: Trace edema bilaterally.

 

LABORATORY DATA: White count 9.6, hemoglobin 15, hematocrit 45, platelet count

239. Sodium 141, potassium 3.7, chloride 106, bicarbonate 25, BUN 16, creatinine

1.04, glucose 101, calcium 8.6, magnesium 1.9, total bilirubin 1.5, AST 20, ALT

43, alkaline phosphatase of 70, total protein 6.1, albumin 2.8, BNP is 1100.

Respiratory panel is negative. Methicillin-resistant Staphylococcus aureus (MRSA
)

is negative. Sputum culture: Normal analy. Blood culture: No growth after 24

hours.

 

CT chest: PE protocol 09/07/2017. No evidence of pulmonary embolism. Moderate

bilateral diffuse alveolar infiltrates and adenopathy suggesting multifocal

pneumonia. Followup to resolution is recommended. Cannot exclude cardiomegaly 
for

which consultation may be advised.

 

ASSESSMENT AND PLAN: This is a 37-year-old male with a history of alcohol abuse,

with a family history of congestive heart failure in his father in his 40s and

grandfather on the father's side, presents to the emergency room with ongoing

dyspnea, bloody sputum for about 6 weeks with no recent travel. He denied any

fever, chills, chest pain, abdominal pain. He had been admitted for multifocal

pneumonia, started on intravenous ceftriaxone and azithromycin with complaints 
of

diaphoresis and chest tightness with azithromycin, no wheezing, stridor, rash or

significant respiratory distress. Azithromycin has been discontinued. He was

found to have cardiomegaly and BNP level of 1100. The patient was started on

Lasix diuresis, has been diuresing well with improvement in shortness of breath.

Echo is still pending.

 

CURRENT ISSUES:

1. Multifocal community-acquired pneumonia. CT of the chest was negative for

pulmonary embolism (PE). Sputum culture showed normal analy. MRSA screen is

negative. Respiratory panel is negative. Urine Legionella and urine 
Streptococcal

antigen have been ordered. Blood cultures are negative. Patient had been placed

initially on Rocephin and azithromycin. The patient had complaints of chest

tightness with azithromycin, which has since been discontinued. He is currently

only on ceftriaxone. He has been afebrile with normalization of his white count

and lactic acid.

 

2. Congestive heart failure (CHF). Unknown ejection fraction with history of

alcohol abuse and family history of congestive heart failure at an early age in

his father, who was in his 40's when he developed congestive heart failure and

was apparently not ischemic in nature. His cardiac markers are negative. He has

no signs of high blood pressure. He is currently being diuresed with Lasix 40 mg

every 6 hours with improvement in his respiratory status. Strict intake and

output, daily weight and fluid restriction. Cardiac rehabilitation as an

outpatient. He has unknown ejection fraction.  We have ordered an echocardiogram

in light of persistent nonsustained ventricular tachycardia on telemetry. We 
have

optimized his potassium and magnesium. Will repeat a metabolic panel and

magnesium later this evening in light of the Lasix diuresis and arrhythmias on

the monitor. Depending on the results of the echocardiogram, we may need

cardiology involved.  If his ejection fraction is less than 40%, most likely 
will

need evaluation with stress test as an outpatient and optimal medical therapy

once he is euvolemic with angiotensin-converting enzyme (ACE) inhibitor if blood

pressure permits, beta blockade and continued diuretic. Lipid panel is

unremarkable. No need for statins at the moment.

 

3. Alcohol abuse. The patient remains tachycardic. He currently has no asterixis

on exam. No stigmata of liver disease. The patient has been given multivitamin,

thiamine, folate to be scheduled daily and as needed Serax for his comfort.

Delirium tremens precautions.

 

4. Deep vein thrombosis (DVT) prophylaxis with Lovenox.

 

DISPOSITION: The patient is anxious to go home. He states that he does not have

any vacation time at work and will need to be at work on Monday. I have

reiterated to him that at this time with his ejection fraction being unknown and

congestive heart failure, which is new onset, we would need this information to

decide if it is diastolic or systolic failure. If he does develop systolic

failure with ejection fraction less than 40%, he will need to be reevaluated as

an outpatient with a stress test to rule out ischemic heart disease. If no

significant valvular disease is noted and he has an ejection fraction of 50% and

higher, issues may be diastolic dysfunction. Then, the patient may be discharged

home with cardiac rehabilitation with outpatient followup with cardiology once 
he

is euvolemic. Therefore, will order an echocardiogram to determine the next

course of action. This has all been explained to the patient. His antibiotics 
can

be changed to Avelox at discharge or 750 mg of Levaquin.



Addendum 12:21pm: per Dr. Clinton, ECHO EF 10-20% mild to moderate MR, 

mild to moderate TR.  Cardiology will be consulted for chf mgt, and timing of

Ischemic heart disease workup either with stress test or cardiac cath.

will need optimization with ACE inh, beta blocker once euvolemic. Defer to

cardiology to decide whether pt needs emergent transfer for cath. 

SALVADOR

## 2017-09-10 VITALS — DIASTOLIC BLOOD PRESSURE: 70 MMHG | SYSTOLIC BLOOD PRESSURE: 108 MMHG

## 2017-09-10 VITALS — DIASTOLIC BLOOD PRESSURE: 76 MMHG | SYSTOLIC BLOOD PRESSURE: 118 MMHG

## 2017-09-10 VITALS — DIASTOLIC BLOOD PRESSURE: 80 MMHG | SYSTOLIC BLOOD PRESSURE: 119 MMHG

## 2017-09-10 VITALS — SYSTOLIC BLOOD PRESSURE: 115 MMHG | DIASTOLIC BLOOD PRESSURE: 76 MMHG

## 2017-09-10 VITALS — DIASTOLIC BLOOD PRESSURE: 64 MMHG | SYSTOLIC BLOOD PRESSURE: 103 MMHG

## 2017-09-10 LAB
ALBUMIN SERPL BCG-MCNC: 3 GM/DL (ref 3.2–5.2)
ALBUMIN/GLOB SERPL: 0.83 {RATIO} (ref 1–1.93)
ALP SERPL-CCNC: 74 U/L (ref 45–117)
ALT SERPL W P-5'-P-CCNC: 52 U/L (ref 12–78)
ANION GAP SERPL CALC-SCNC: 11 MEQ/L (ref 8–16)
AST SERPL-CCNC: 33 U/L (ref 15–37)
BASOPHILS # BLD AUTO: 0 K/MM3 (ref 0–0.2)
BASOPHILS NFR BLD AUTO: 0.4 % (ref 0–1)
BILIRUB SERPL-MCNC: 2 MG/DL (ref 0.2–1)
BUN SERPL-MCNC: 17 MG/DL (ref 7–18)
CALCIUM SERPL-MCNC: 8.8 MG/DL (ref 8.5–10.1)
CHLORIDE SERPL-SCNC: 106 MEQ/L (ref 98–107)
CO2 SERPL-SCNC: 24 MEQ/L (ref 21–32)
CREAT SERPL-MCNC: 1.13 MG/DL (ref 0.7–1.3)
EOSINOPHIL # BLD AUTO: 0.2 K/MM3 (ref 0–0.5)
EOSINOPHIL NFR BLD AUTO: 1.4 % (ref 0–3)
ERYTHROCYTE [DISTWIDTH] IN BLOOD BY AUTOMATED COUNT: 13.1 % (ref 11.5–14.5)
GFR SERPL CREATININE-BSD FRML MDRD: > 60 ML/MIN/{1.73_M2} (ref 60–?)
GLUCOSE SERPL-MCNC: 98 MG/DL (ref 70–105)
LARGE UNSTAINED CELL #: 0.3 K/MM3 (ref 0–0.4)
LARGE UNSTAINED CELL %: 2.2 % (ref 0–4)
LYMPHOCYTES # BLD AUTO: 2.9 K/MM3 (ref 1.5–4.5)
LYMPHOCYTES NFR BLD AUTO: 21.6 % (ref 24–44)
MAGNESIUM SERPL-MCNC: 2 MG/DL (ref 1.8–2.4)
MCH RBC QN AUTO: 32.1 PG (ref 27–33)
MCHC RBC AUTO-ENTMCNC: 32.4 G/DL (ref 32–36.5)
MCV RBC AUTO: 99.1 FL (ref 80–96)
MONOCYTES # BLD AUTO: 0.5 K/MM3 (ref 0–0.8)
MONOCYTES NFR BLD AUTO: 4.3 % (ref 0–5)
NEUTROPHILS # BLD AUTO: 8.7 K/MM3 (ref 1.8–7.7)
NEUTROPHILS NFR BLD AUTO: 70.2 % (ref 36–66)
PLATELET # BLD AUTO: 255 K/MM3 (ref 150–450)
POTASSIUM SERPL-SCNC: 4.4 MEQ/L (ref 3.5–5.1)
PROT SERPL-MCNC: 6.6 GM/DL (ref 6.4–8.2)
SODIUM SERPL-SCNC: 141 MEQ/L (ref 136–145)
WBC # BLD AUTO: 12.3 K/MM3 (ref 4–10)

## 2017-09-10 RX ADMIN — ALBUTEROL SULFATE PRN MG: 2.5 SOLUTION RESPIRATORY (INHALATION) at 07:09

## 2017-09-10 RX ADMIN — SODIUM CHLORIDE, PRESERVATIVE FREE SCH ML: 5 INJECTION INTRAVENOUS at 11:09

## 2017-09-10 RX ADMIN — ALBUTEROL SULFATE PRN MG: 2.5 SOLUTION RESPIRATORY (INHALATION) at 04:18

## 2017-09-10 RX ADMIN — SODIUM CHLORIDE, PRESERVATIVE FREE SCH ML: 5 INJECTION INTRAVENOUS at 20:43

## 2017-09-10 RX ADMIN — ALBUTEROL SULFATE PRN MG: 2.5 SOLUTION RESPIRATORY (INHALATION) at 19:37

## 2017-09-10 RX ADMIN — CEFTRIAXONE SCH MLS/HR: 1 INJECTION, POWDER, FOR SOLUTION INTRAMUSCULAR; INTRAVENOUS at 05:28

## 2017-09-10 RX ADMIN — ENOXAPARIN SODIUM SCH MG: 40 INJECTION SUBCUTANEOUS at 08:21

## 2017-09-10 RX ADMIN — CEFTRIAXONE SCH MLS/HR: 1 INJECTION, POWDER, FOR SOLUTION INTRAMUSCULAR; INTRAVENOUS at 18:11

## 2017-09-10 RX ADMIN — SACUBITRIL AND VALSARTAN SCH TAB: 24; 26 TABLET, FILM COATED ORAL at 20:42

## 2017-09-10 RX ADMIN — SODIUM CHLORIDE, PRESERVATIVE FREE SCH ML: 5 INJECTION INTRAVENOUS at 05:29

## 2017-09-10 RX ADMIN — FOLIC ACID SCH MG: 1 TABLET ORAL at 08:22

## 2017-09-10 RX ADMIN — SACUBITRIL AND VALSARTAN SCH TAB: 24; 26 TABLET, FILM COATED ORAL at 08:21

## 2017-09-10 RX ADMIN — Medication SCH MG: at 08:22

## 2017-09-10 RX ADMIN — ALBUTEROL SULFATE PRN MG: 2.5 SOLUTION RESPIRATORY (INHALATION) at 11:01

## 2017-09-10 RX ADMIN — SPIRONOLACTONE SCH MG: 25 TABLET, FILM COATED ORAL at 08:21

## 2017-09-10 RX ADMIN — MULTIPLE VITAMINS W/ MINERALS TAB SCH TAB: TAB at 08:22

## 2017-09-10 NOTE — IPN
DATE:  09/10/2017

 

Mr. Seun Cobb was seen earlier this morning, he was sitting up in bed

in no acute distress at rest.  There was no orthopnea or paroxysmal nocturnal

dyspnea (PND).  He denied any chest pain, palpitations.  He has been ambulating

in the room.  He has no cough or hemoptysis.  He was initially admitted on

09/07/2017 with progressive shortness of breath and was found to have findings

that may be related to pneumonia on his chest x-ray and he was started on IV

antibiotics.  While on telemetry, he was found to have once of ventricular

tachycardia.  He underwent an echocardiogram on 09/09/2017 and it revealed

severely depressed global ventricular systolic function. He was started on

current cardiac medications.  He has been on furosemide prior to that and he has

responded.  When I saw him, he appeared to be compensated.

 

PHYSICAL EXAMINATION:  The patient is alert and awake in no acute distress at

rest and his vital signs this morning revealed a blood pressure of 119/80 with a

pulse of 96, respirations 18 and his maximal temperature is 98.5 degrees

Fahrenheit, oxygen saturation of 95% on room air.  He has a negative fluid

balance of 1.8 liter for 09/09/2017.  Prior to that on 09/08/2017, he had a

positive fluid balance of 785 mL.

EXAMINATION OF THE HEAD:  Normocephalic, atraumatic.

NECK:  Supple. No jugular venous distension (JVD).

LUNGS:  The lungs did not reveal any wheezing or crackles.

HEART EXAMINATION:  Reveal normal S1, S2, S4 gallops.  The point of maximal

impulse (PMI) is distant inferiorly and laterally.  There is no rub. There is

systolic murmur grade I to 2/6 at the lower left sternal border without any

significant radiation.

ABDOMEN:  Soft and nontender.

EXTREMITIES:  Revealed no pitting edema.  Peripheral pulses, dorsalis pedis are

+2 and equal.

NEUROLOGICAL EXAMINATION:  Negative for focal deficient.

 

LABORATORY:  Complete blood count (CBC) on 09/10/2017 revealed a white blood

count (WBC) of 12.8, hemoglobin is 16.7, hematocrit 51.5 and platelets 255,000.

Basic metabolic panel (BMP) revealed a sodium of 141, potassium 4.4, chloride

106, CO2 24, BUN 17, creatine 1.13, glomerular filtration rate (GFR) more than 
60

and calcium 8.8.  Magnesium is 2.0.  Liver enzymes revealed a total bilirubin of

2.0, AST 33, ALT 52, alkaline phosphatase 74, total protein 6.6, albumin 3.3.

 

Telemetry revealed normal sinus rhythm, left tachycardia.  There is once of

nonsustained ventricular tachycardia.

 

IMPRESSION:

1.  Status post decompensated congestive heart failure secondary to left

ventricular systolic dysfunction.  The patient has no history of hypertension,

hyperlipidemia, diabetes mellitus.  He said that he is a smoker and said that he

will not go back to smoking.  He used to drink alcohol and said that he will not

drink alcohol.  He is well aware that can cause his cardiomyopathy.  His father

did have a history of cardiomyopathy and his heart function has improved.  Will

continue current medications.  Today, again, I will give him another dose of IV

furosemide/Lasix.  Will monitor his electrolytes and kidney function.  Aric, 
will

be contacted for a life vest and this was discussed with him, he has agreed.

2.  Possible pneumonia and this is being addressed.

3.  History of smoking and he said that he is not going to smoke.  He also said

that he will not back to drinking alcohol.

4.  Nonsustained ventricular tachycardia.  He will be discharged on a life vest.

Will continue telemetry and I will increase his beta blockers, carvedilol.

 

He is well aware that he might need an implantable cardioverted defibrillator

(ICD) if his left ventricular systolic function does not improve.

SALVADOR

## 2017-09-10 NOTE — ECGEPIP
Stationary ECG Study

                              [facility]

                                       

                                       Test Date:    2017

Pat Name:     FADI PAPPAS              Department:   

Patient ID:   [id number]                 Room:         [place] [address]

Gender:       M                        Technician:   

:          1980               Requested By: GAGE BEJARANO

Order Number: [id number]     Reading MD:   Daniel Real

                                 Measurements

Intervals                              Axis          

Rate:         116                      P:            37

MD:           120                      QRS:          96

QRSD:         102                      T:            -76

QT:           353                                    

QTc:          492                                    

                           Interpretive Statements

Sinus tachycardia with a PVC

Low QRS complex voltage in the limb leads

Anteroseptal MI, age indeterminate

Nonspecific ST-T wave abnormalities

No significant change when compared to prior tracing of 2017

 

Electronically Signed On 9- 9:07:09 EDT by Daniel Real

## 2017-09-10 NOTE — IPN
DATE:  09/10/2017

 

SUBJECTIVE:  Patient seen and examined at the bedside.  Chart has been reviewed.

He denies any fevers, chills, cough, shortness of breath.  Patient feels well

this morning, is ambulating well up and down the hallway yesterday without much

difficulty.  Telemetry remains with sinus rhythm.  No repeat episodes of

nonsustained ventricular tachycardia overnight.  Blood pressure has been well

maintained.  He has no new complaints.  Tolerating his diet well.  No nausea,

vomiting or abdominal pain.  Per nursing, patient had mistakenly flushed his

urine output and could not be documented overnight, so input and output (I and O)

may be slightly lower than the actual value.

 

OBJECTIVE:

Temperature 98.4, pulse 96, respiratory rate 18, blood pressure 119/80, 95% on

room air.

INPUT AND OUTPUT:  Input of 180, output of 2025, negative 1845.  Current weight

is 79 kg from previous admission weight of 82.9 kg.

GENERAL:  Patient is awake, alert, oriented times three.  Answering questions

appropriately.

No significant lymphadenopathy, thyromegaly.  Pupils were reactive to light.

Extraocular muscles intact.  No pharyngeal erythema.  No tonsillar exudate.  No

jugular venous distention.

LUNGS:  Clear to auscultation.  No wheezing, rales or rhonchi.

HEART:  S1, S2.  Sinus tachycardia.  Mid systolic ejection murmur noted in the

left lower sternal border as well as a diastolic murmur in the apical region,

radiating to the axilla.

ABDOMEN:  Soft, nontender, nondistended.  Positive bowel sounds.

EXTREMITIES:  No pitting edema.

 

LABORATORY DATA:

White count 12.3, hemoglobin 16, hematocrit 51, platelet count 255.

 

70% neutrophils.

 

Sodium 141, potassium 4.5, chloride 106, bicarbonate 24, BUN 17, creatinine 1.13,

glucose 98.

 

MICROBIOLOGY:  Respiratory 09/07/2017:  Negative.

 

Methicillin-resistant Staphylococcus aureus (MRSA) screen:  Not identified.

 

Sputum culture:  Normal analy present.

 

Blood culture:  Negative after 48 hours.

 

Chest X-ray 09/09/2017 showed multifocal infiltrates similar to prior

examination.

 

Echocardiogram, read by Dr. Rakesh Clinton, on 09/008/2017, shows moderately enlarged

left ventricle with severe diffuse hypokinesis.  Severely depressed global left

ventricular systolic function with severe diffuse hypokinesis, moderately

enlarged left ventricle.  Left ventricular systolic function estimated to be

10-20%.  Mildly dilated left atrium, normal right ventricle, trace pericardial

effusion.  No evidence of cardiac tamponade.  Normal mitral valve, normal

tricuspid valve.  Trace pulmonic regurgitation, calculated PA pressure 40-50

mmHg, with moderate tricuspid regurgitation and moderate pulmonary hypertension.

Mild to moderate mitral regurgitation with mildly enlarged left atrium.

 

ASSESSMENT AND PLAN:

This is a 37-year-old male with history of alcohol abuse, family history of

congestive heart failure (CHF) with his father in his 40s and grandfather on the

father's side, presented to the emergency room (ER) with ongoing dyspnea, bloody

sputum for six weeks with no recent travel.  Denied fever, chills, chest pain,

abdominal pain, admitted for multifocal pneumonia.  Started on intravenous (IV)

ceftriaxone, azithromycin.  Complains of diaphoresis, chest tightness with

azithromycin, but no wheezing, stridor, rash or significant respiratory distress.

Azithromycin has been discontinued.  He was found to have cardiomegaly on CT of

the chest with brain natriuretic peptide (BNP) level of 1100.  Patient was

started on Lasix diuresis, was diuresing well with improvement of shortness of

breath.  Echocardiogram shows ejection fraction of 10-20% by visual estimate with

mild to moderate mitral regurgitation and mild to moderate tricuspid

regurgitation.  Dr. Clinton has been consulted from cardiology to assist in

congestive heart failure (CHF) management, systolic dysfunction.  This patient

has had nonsustained ventricular tachycardia, about 13 beats, on telemetry 48

hours ago. Currently on Coreg 6.25 mg twice a day, valsartan 1 tablet twice a

day, and spironolactone.  Patient has diuresed quite well with plans for

defibrillator vest prior to discharge.

 

CURRENT ISSUES:

1.  Congestive heart failure, new onset.  Acute systolic dysfunction with

ejection fraction of 10-20%.  Patient has been placed on Coreg 2.125 mg twice a

day, spironolactone 12.5 mg daily, sacubitril/valsartan one tablet by mouth twice

a day.  Patient has had no acute ischemia or a positive troponin during this

admission.  Defer to Dr. Clinton for evaluation for coronary artery disease with an

angiogram as outpatient.  Patient will need a defibrillator in light of the

recent nonsustained ventricular tachycardia noted on telemetry and severely

depressed left ventricular systolic dysfunction of 10-20%.  This has all been

explained to the patient.  The patient is quite concerned about going back to

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  I have advised him that

this would stress his heart and would cause further decompensation and heart

failure; therefore, I have advised him to proceed with cardiac testing prior to

returning to work.  He would not be cleared to go back to his previous employment

at this time.

 

2.  Multifocal community-acquired pneumonia with reaction or azithromycin with

complaints of diaphoresis.  Patient has been kept on ceftriaxone with no fevers

or chills and improved white count.  Currently 9.6 yesterday, slightly increased

at 12.3 today.  Sputum culture is negative.  Respiratory syncytial virus (RSV)

panel is also negative.  Will review with Dr. Marylene Duah, infectious disease,

in the morning for possible atypical coverage.  Currently not on any.  He will

need a quinolone for this, but with risk of increasing his QT interval.  Will

discuss with Dr. Ford in the morning.

 

3.  Alcohol abuse.  Patient has no signs of acute withdrawal.  No stigmata of

liver disease.  He does have slightly elevated bilirubin level.  He has been

given multivitamin, thiamin, folate scheduled daily, as needed Serax for his

comfort and delirium tremens precautions.

 

4.  Deep venous thrombosis (DVT) prophylaxis with Lovenox.

 

DISPOSITION:  Will defer to Dr. Clinton for further titration of medications and

clearance prior to discharge home, as the patient does have a recent

documentation of low ejection fraction of 10-20% with nonsustained ventricular

tachycardia.  He will need a defibrillator vest prior to discharge to be arranged

by cardiology, as well as an ischemic workup with a coronary angiogram at some

point.

## 2017-09-11 VITALS — SYSTOLIC BLOOD PRESSURE: 114 MMHG | DIASTOLIC BLOOD PRESSURE: 69 MMHG

## 2017-09-11 VITALS — DIASTOLIC BLOOD PRESSURE: 63 MMHG | SYSTOLIC BLOOD PRESSURE: 92 MMHG

## 2017-09-11 VITALS — DIASTOLIC BLOOD PRESSURE: 69 MMHG | SYSTOLIC BLOOD PRESSURE: 114 MMHG

## 2017-09-11 VITALS — SYSTOLIC BLOOD PRESSURE: 103 MMHG | DIASTOLIC BLOOD PRESSURE: 68 MMHG

## 2017-09-11 VITALS — SYSTOLIC BLOOD PRESSURE: 100 MMHG | DIASTOLIC BLOOD PRESSURE: 57 MMHG

## 2017-09-11 VITALS — DIASTOLIC BLOOD PRESSURE: 61 MMHG | SYSTOLIC BLOOD PRESSURE: 107 MMHG

## 2017-09-11 LAB
ALBUMIN SERPL BCG-MCNC: 2.6 GM/DL (ref 3.2–5.2)
ALBUMIN/GLOB SERPL: 0.76 {RATIO} (ref 1–1.93)
ALP SERPL-CCNC: 72 U/L (ref 45–117)
ALT SERPL W P-5'-P-CCNC: 49 U/L (ref 12–78)
ANION GAP SERPL CALC-SCNC: 10 MEQ/L (ref 8–16)
AST SERPL-CCNC: 32 U/L (ref 15–37)
BASOPHILS # BLD AUTO: 0.1 K/MM3 (ref 0–0.2)
BASOPHILS NFR BLD AUTO: 0.9 % (ref 0–1)
BILIRUB SERPL-MCNC: 1.9 MG/DL (ref 0.2–1)
BUN SERPL-MCNC: 19 MG/DL (ref 7–18)
CALCIUM SERPL-MCNC: 8.4 MG/DL (ref 8.5–10.1)
CHLORIDE SERPL-SCNC: 105 MEQ/L (ref 98–107)
CO2 SERPL-SCNC: 23 MEQ/L (ref 21–32)
CREAT SERPL-MCNC: 1.04 MG/DL (ref 0.7–1.3)
EOSINOPHIL # BLD AUTO: 0.2 K/MM3 (ref 0–0.5)
EOSINOPHIL NFR BLD AUTO: 1.9 % (ref 0–3)
ERYTHROCYTE [DISTWIDTH] IN BLOOD BY AUTOMATED COUNT: 13.1 % (ref 11.5–14.5)
GFR SERPL CREATININE-BSD FRML MDRD: > 60 ML/MIN/{1.73_M2} (ref 60–?)
GLUCOSE SERPL-MCNC: 87 MG/DL (ref 70–105)
LARGE UNSTAINED CELL #: 0.3 K/MM3 (ref 0–0.4)
LARGE UNSTAINED CELL %: 2.8 % (ref 0–4)
LYMPHOCYTES # BLD AUTO: 3.3 K/MM3 (ref 1.5–4.5)
LYMPHOCYTES NFR BLD AUTO: 28.6 % (ref 24–44)
MAGNESIUM SERPL-MCNC: 2 MG/DL (ref 1.8–2.4)
MCH RBC QN AUTO: 32.6 PG (ref 27–33)
MCHC RBC AUTO-ENTMCNC: 33.2 G/DL (ref 32–36.5)
MCV RBC AUTO: 98.3 FL (ref 80–96)
MONOCYTES # BLD AUTO: 0.6 K/MM3 (ref 0–0.8)
MONOCYTES NFR BLD AUTO: 5.9 % (ref 0–5)
NEUTROPHILS # BLD AUTO: 6.2 K/MM3 (ref 1.8–7.7)
NEUTROPHILS NFR BLD AUTO: 59.9 % (ref 36–66)
PLATELET # BLD AUTO: 230 K/MM3 (ref 150–450)
POTASSIUM SERPL-SCNC: 4.6 MEQ/L (ref 3.5–5.1)
PROT SERPL-MCNC: 6 GM/DL (ref 6.4–8.2)
SODIUM SERPL-SCNC: 138 MEQ/L (ref 136–145)
WBC # BLD AUTO: 10.4 K/MM3 (ref 4–10)

## 2017-09-11 RX ADMIN — CEFTRIAXONE SCH MLS/HR: 1 INJECTION, POWDER, FOR SOLUTION INTRAMUSCULAR; INTRAVENOUS at 16:59

## 2017-09-11 RX ADMIN — MULTIPLE VITAMINS W/ MINERALS TAB SCH TAB: TAB at 08:28

## 2017-09-11 RX ADMIN — CEFTRIAXONE SCH MLS/HR: 1 INJECTION, POWDER, FOR SOLUTION INTRAMUSCULAR; INTRAVENOUS at 05:26

## 2017-09-11 RX ADMIN — ALBUTEROL SULFATE PRN MG: 2.5 SOLUTION RESPIRATORY (INHALATION) at 07:18

## 2017-09-11 RX ADMIN — SACUBITRIL AND VALSARTAN SCH TAB: 24; 26 TABLET, FILM COATED ORAL at 08:28

## 2017-09-11 RX ADMIN — ENOXAPARIN SODIUM SCH MG: 40 INJECTION SUBCUTANEOUS at 08:35

## 2017-09-11 RX ADMIN — ENOXAPARIN SODIUM SCH MG: 40 INJECTION SUBCUTANEOUS at 08:28

## 2017-09-11 RX ADMIN — Medication SCH MG: at 08:28

## 2017-09-11 RX ADMIN — ALBUTEROL SULFATE PRN MG: 2.5 SOLUTION RESPIRATORY (INHALATION) at 04:34

## 2017-09-11 RX ADMIN — FOLIC ACID SCH MG: 1 TABLET ORAL at 08:28

## 2017-09-11 RX ADMIN — SPIRONOLACTONE SCH MG: 25 TABLET, FILM COATED ORAL at 08:28

## 2017-09-11 RX ADMIN — ALBUTEROL SULFATE PRN MG: 2.5 SOLUTION RESPIRATORY (INHALATION) at 15:45

## 2017-09-11 RX ADMIN — SODIUM CHLORIDE, PRESERVATIVE FREE SCH ML: 5 INJECTION INTRAVENOUS at 14:14

## 2017-09-11 RX ADMIN — SODIUM CHLORIDE, PRESERVATIVE FREE SCH ML: 5 INJECTION INTRAVENOUS at 05:26

## 2017-09-11 NOTE — IPN
DATE:  09/11/2017

 

The patient seen and examined at the bedside.  The chart has been reviewed.  
This

morning, the patient denies any chest pain, pressure, tightness, shortness of

breath, lightheadedness, dizziness, nausea, vomiting, diarrhea, cough,

hemoptysis.  Telemetry was unremarkable.  No PVCs or ventricular tachycardia

noted on telemetry over the past 24 hours.

 

Temperature 97.4, pulse 87, respiratory rate 18, blood pressure 107/61, and 96%

on room air.

Generally, the patient is awake, alert, oriented times three.  Answering

questions appropriately.  No use of respiratory accessory muscles.  No jugular

venous distention.

Lungs are clear to auscultation with fine crackles at bilateral bases.

Heart:  S1, S2.  Sinus rhythm.  Apical murmur radiating to the axilla.

Abdomen is soft, nontender, nondistended.

Extremities:  No cyanosis, clubbing or any pitting edema.

 

LABORATORY DATA:  Have been reviewed.

 

IMAGING STUDIES:  Have been reviewed.

 

Echocardiogram read by Dr. Clinton on 09/8/2017 shows moderately enlarged left

ventricle with severe diffuse hypokinesis, fairly depressed global left

ventricular systolic function with severe diffuse hypokinesis, moderately

enlarged left ventricle, ejection fraction 10-20%, PA pressure 40-50, moderate

tricuspid regurgitation, moderate pulmonary hypertension, mild to moderate 
mitral

regurgitation, mildly enlarged left atrium.

 

ASSESSMENT AND PLAN:  This is a 37-year-old male with history of alcohol abuse,

family history of congestive heart failure (CHF) in his father in his 40s and

grandfather on the father's side, who presented to the emergency room (ER) with

ongoing dyspnea, bloody sputum for about five months with increasing lower

extremity edema and no recent travel.  He denied any fever, chills, chest pain,

abdominal pain.  Admitted for treatment for possible multifocal pneumonia.

Started on intravenous (IV) ceftriaxone and azithromycin.  While on azithromycin
,

the patient complained of diaphoresis and chest tightness, but no wheezing,

stridor, rash or respiratory distress.  Azithromycin has been discontinued.  He

was found to have cardiomegaly on CT of the chest with brain natriuretic peptide

(BNP) level of 1100.  The patient was started on Lasix diuresis for new onset

congestive heart failure of unknown etiology.  He was diuresing well with

improvement in shortness of breath.  Echocardiogram showed ejection fraction of

10-20% with mild to moderate mitral regurgitation and mild to moderate tricuspid

regurgitation.  Dr. Clinton has been consulted from [facility]

to assist in congestive heart failure (CHF) management.  The patient had 13 
beats

of nonsustained ventricular tachycardia on telemetry 72 hours ago and has had no

nonsustained ventricular tachycardia or PVCs on telemetry for the past 24 hours.

Cardiac markers have been negative.  No signs of acute myocardial infarction

(MI).  Currently on Coreg 6.25 mg twice a day and spironolactone and has 
diuresed

well with plans for defibrillator vest at discharge.

 

IMPRESSION:

1.  Congestive heart failure with systolic dysfunction, new onset and ejection

fraction of 10-20%.  The patient is currently on spironolactone and Coreg and

doing quite well.  The patient's blood pressure has been well maintained.  He 
has

no complaints of orthostasis.  Per cardiology, okay to be discharged,

defibrillator vest to be arranged.  At this time, patient and family service

(PFS) has been consulted and Dr. Clinton has been consulted for arrangement for the

defibrillator vest at discharge.  The patient is concerned about going back to

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  I have advised him that 
this

would place significant stress on his heart should he go back to full activity,

therefore, I have advised him to proceed with Dr. Clinton's recommendations and

further evaluation prior to returning to his normal work responsibilities.

2.  Multifocal community-acquired pneumonia with reaction to azithromycin with

complaints of diaphoresis.  The patient has been on IV ceftriaxone and may be

discharged home on Omnicef.  Once concern with quinolones would be prolonged QT

in light of recent nonsustained ventricular tachycardia and poorly functioning

systolic function with the heart 10-20%, I am reluctant to discharge him on a

quinolone.  The patient's respiratory panel has been negative.

3.  Alcohol abuse.  No signs of acute withdrawal.  May have contributed to

cardiomyopathy.  The patient will need to be evaluated with a coronary

arteriogram at some point to determine risk of ischemic heart disease.  The

patient has to abstain from alcohol and has decided to quit smoking and quit

drinking as well.

4.  Deep venous thrombosis (DVT) prophylaxis with Lovenox.

 

DISPOSITION:  At this time, the patient is medically stable; however,

recommendations are for defibrillator vest.  PFS has been consulted.  The 
patient

does not have prescription coverage.  Will also need to help with paying for his

medications.  PFS has been consulted for this as well.

MTDD

## 2017-09-11 NOTE — IPN
DATE:  09/11/2017

 

Mr. Cobb tells me that he is feeling really well, dramatically improved since

admission.  He was able to ambulate on progressive care unit (PCU).  He

apparently made seven rounds around the nursing station and had no difficulty

doing it.  There was no paroxysmal nocturnal dyspnea or orthopnea.  He did not

have any VT.

 

Vital signs: Blood pressure 114/69, heart rate has been mostly around 90.  It was

about 95 during my evaluation.  His saturation is 97% on room air.  Blood

pressure systolic between 100 and 115.  His fluid balance yesterday was 1 liter

negative.  He weighs 76.3 kg.  He is alert and oriented and appropriate.  I do

not appreciate jugular venous pressure elevation.  Lungs are clear to

auscultation on the left.  There are a few crackles on the right base.  Heart

exam reveals a regular rhythm.  There is displaced precordial impulse laterally

and there is a murmur at the apex, about 1 or 2 out of 6 intensity that is

holosystolic in nature.  I do not appreciate gallop as such.  Abdomen is soft,

nontender.  There is no peripheral edema.  Peripheral pulses are good quality.

 

LABORATORY:

Normal CBC.  Normal basic metabolic panel.  Albumin is 2.6

 

ASSESSMENT AND PLAN:

Mr. Cobb is a 37-year-old man who has no significant past medical history and

who presents with acute systolic/diastolic congestive heart failure.  He is

markedly symptomatically improved on standard medications.  I believe that it is

okay for the patient to be discharged home.  I am going to advance the dose of

carvedilol to 12.5 mg twice a day.  He does not seem to have any signs of low

cardiac output and I believe that he will be tolerating the medicines well.

There is a desire to place a LifeVest, but I am not sure whether his insurance

will allow but otherwise I believe that he can be discharged home, the

arrangements by which are per Dr. Clinton.

## 2017-09-12 LAB — BACTERIA SPEC BFLD CULT: (no result)

## 2017-09-22 NOTE — DSES
DATE OF ADMISSION:  09/07/2017

DATE OF DISCHARGE:  09/11/2017

 

PRIMARY DISCHARGE DIAGNOSES:

1.  Congestive heart failure (CHF) with severe systolic dysfunction, new onset.

Ejection fraction of 10-20%.

2.  Dilated cardiomyopathy.

3.  Multifocal community acquired pneumonia with reaction to azithromycin.

Complains of diaphoresis.

4.  Alcohol abuse.

5.  Probable alcoholic cardiomyopathy.

6.  Nonsustained ventricular tachycardic (V-tach).

 

DISCHARGE MEDICATIONS:

- carvedilol 6.25 mg twice a day

- cefdinir 300 mg by mouth twice a day for 10 days

- ENTRESTO (sacubitril/valsartan) one tablet twice a day

- spironolactone 12.5 mg daily

 

CONSULTANTS DURING THIS ADMISSION:

Cardiologist, Dr. Rakesh Clinton of the [facility].

 

DISCHARGE INSTRUCTIONS:

1.  Defibrillator vest to be arranged by cardiology prior to discharge due to

high risk of V-tach with severe systolic dysfunction, ejection fraction of

10-15%, most likely due to alcoholic dilated cardiomyopathy with episodes of

nonsustained V-tach during this admission.

2.  Multifocal pneumonia.  The patient's primary care physician to check for

complete resolution in 4-6 weeks.  Referral to pulmonary if infiltrate does not

resolve and the patient has ongoing symptoms.

 

HOSPITAL COURSE:  This is a 37-year-old male with a history of alcohol abuse,

family history of congestive heart failure (CHF), with his father having

congestive heart failure (CHF) in his 40s, and grandfather on the paternal side,

who presented to the emergency room with ongoing dyspnea, blood sputum for six

weeks with no recent travel.  No fever, chills, chest pain, abdominal pain.  He

was initially admitted for multifocal pneumonia on CT of the chest.  The patient

was started on IV ceftriaxone and azithromycin.  With two doses of azithromycin,

the patient complained of diaphoresis and chest tightness, but no wheezing,

stridor, rash or significant respiratory distress or respiratory failure.

Azithromycin has been discontinued.  The patient had significant lower extremity

edema, BNP of 1100 and cardiomegaly on CT of the chest of unexplained etiology.

The patient has a known history of alcohol abuse with suspicion for alcoholic

cardiomyopathy.  Since the patient complained of severe shortness of breath, an

echocardiogram was performed which showed ejection fraction of 10-20% with

episodes of nonsustained V-tach, 14 beats on telemetry.  Echocardiogram 
confirmed

and read by Dr. Rakesh Clinton, showed also a mild to moderate mitral regurgitation

and mild to moderate tricuspid regurgitation with severe systolic dysfunction

with ejection fraction of 10-20%.  He was given Lasix intravenously, placed on

fluid restriction, and strict intake and output with admission peak weight of

90.9 kg and discharge weight of 76.3 kg.  The patient was continued on Lasix

diuresis.  Repeat chest x-ray continued to show multifocal infiltrates.  He was

continued on ceftriaxone.  Sputum culture for the multifocal pneumonia showed

normal analy present.  Methicillin-resistant Staphylococcus aureus (MRSA) screen

was negative and respiratory panel was also negative.  Dr. Rakesh Clinton titrated 
his

medications until the patient was euvolemic.  He was stable on Coreg 6.25 twice 
a

day and ENTRESTO one tablet twice a day and spironolactone 12.5 mg daily.  Due 
to

alcohol abuse, the patient was kept on multivitamin, thiamine and folate, and

Serax as needed.  He exhibited no delirium tremens symptoms or alcoholic

withdrawal symptoms during his hospital stay.  He remained afebrile throughout

the entire admission.  White count remained at 10.4 at discharge with a peak

white count of 12.3.  Blood culture was negative after five days.

 

LABORATORY DATA ON DISCHARGE:  White count 10.4, hemoglobin 16, hematocrit 48,

platelet count of 230, sodium 138, potassium 4.6, chloride 105, bicarb 23, BUN

19, creatinine 1, glucose of 87, total bilirubin of 1.9, AST of 32, ALT of 49,

alkaline phosphatase of 72.

 

Urine legionella was negative.  Urine Streptococcus pneumoniae negative.

 

Microbiology:  09/07/2017 blood culture no growth after 5 days.  Sputum culture

on 09/07/2017 normal analy present.  MRSA screen on 09/07 negative.  Respiratory

panel on 09/07 negative.

 

FOLLOWUP INSTRUCTIONS:

1.  Multiple focal pneumonia needs further evaluation.  If it does not resolve,

then may require pulmonary referral to rule out other causes of bilateral

infiltrates.

 

2.  The patient's alcoholic cardiomyopathy will need to be evaluated for 
ischemic

causes.  Defer to Dr. Clinton for referral to [facility] for a coronary angiogram as

well as automatic implantable cardioverter-defibrillator (AICD) placement after

the patient has been optimized for three months medically.

[facility]

## 2017-11-22 ENCOUNTER — HOSPITAL ENCOUNTER (OUTPATIENT)
Dept: HOSPITAL 53 - M WUC | Age: 37
End: 2017-11-22
Attending: FAMILY MEDICINE
Payer: MEDICAID

## 2017-11-22 DIAGNOSIS — R53.83: ICD-10-CM

## 2017-11-22 DIAGNOSIS — Z13.220: Primary | ICD-10-CM

## 2017-11-22 DIAGNOSIS — R93.8: ICD-10-CM

## 2017-11-22 LAB
ALBUMIN SERPL BCG-MCNC: 3.7 GM/DL (ref 3.2–5.2)
ALBUMIN/GLOB SERPL: 1 {RATIO} (ref 1–1.93)
ALP SERPL-CCNC: 135 U/L (ref 45–117)
ALT SERPL W P-5'-P-CCNC: 66 U/L (ref 12–78)
ANION GAP SERPL CALC-SCNC: 7 MEQ/L (ref 8–16)
AST SERPL-CCNC: 43 U/L (ref 7–37)
BASOPHILS # BLD AUTO: 0 10^3/UL (ref 0–0.2)
BASOPHILS NFR BLD AUTO: 0.4 % (ref 0–1)
BILIRUB SERPL-MCNC: 1.4 MG/DL (ref 0.2–1)
BUN SERPL-MCNC: 14 MG/DL (ref 7–18)
CALCIUM SERPL-MCNC: 8.7 MG/DL (ref 8.5–10.1)
CHLORIDE SERPL-SCNC: 105 MEQ/L (ref 98–107)
CHOLEST SERPL-MCNC: 181 MG/DL (ref ?–200)
CO2 SERPL-SCNC: 29 MEQ/L (ref 21–32)
CREAT SERPL-MCNC: 0.97 MG/DL (ref 0.7–1.3)
EOSINOPHIL # BLD AUTO: 0.2 10^3/UL (ref 0–0.5)
EOSINOPHIL NFR BLD AUTO: 2 % (ref 0–3)
ERYTHROCYTE [DISTWIDTH] IN BLOOD BY AUTOMATED COUNT: 14.8 % (ref 11.5–14.5)
GFR SERPL CREATININE-BSD FRML MDRD: > 60 ML/MIN/{1.73_M2} (ref 60–?)
GLUCOSE SERPL-MCNC: 94 MG/DL (ref 70–105)
IMM GRANULOCYTES NFR BLD: 0.3 % (ref 0–0)
LYMPHOCYTES # BLD AUTO: 2.7 10^3/UL (ref 1.5–4.5)
LYMPHOCYTES NFR BLD AUTO: 28.1 % (ref 24–44)
MCH RBC QN AUTO: 30.4 PG (ref 27–33)
MCHC RBC AUTO-ENTMCNC: 32.3 G/DL (ref 32–36.5)
MCV RBC AUTO: 94 FL (ref 80–96)
MONOCYTES # BLD AUTO: 0.8 10^3/UL (ref 0–0.8)
MONOCYTES NFR BLD AUTO: 8.3 % (ref 0–5)
NEUTROPHILS # BLD AUTO: 5.8 10^3/UL (ref 1.8–7.7)
NEUTROPHILS NFR BLD AUTO: 60.9 % (ref 36–66)
NRBC BLD AUTO-RTO: 0 % (ref 0–0)
PLATELET # BLD AUTO: 307 10^3/UL (ref 150–450)
POTASSIUM SERPL-SCNC: 4.7 MEQ/L (ref 3.5–5.1)
PROT SERPL-MCNC: 7.4 GM/DL (ref 6.4–8.2)
SODIUM SERPL-SCNC: 141 MEQ/L (ref 136–145)
TRIGL SERPL-MCNC: 101 MG/DL (ref ?–150)
WBC # BLD AUTO: 9.6 10^3/UL (ref 4–10)

## 2017-11-22 NOTE — REP
Clinical:  History of pneumonia .

 

Comparison: 09/09/2017 .

 

Technique:  PA and lateral.

 

Findings:

The mediastinum and cardiac silhouette are normal.  The lung fields are clear and

without acute consolidation, effusion, or pneumothorax.  Previous identified

multifocal infiltrate/pneumonia have resolved.  The skeletal structures are

intact and normal.

 

Impression:

1.   No acute cardiopulmonary process.

2.  Previously identified no new focal infiltrate/pneumonia have resolved.

 

 

Signed by

Pasquale Friedman MD 11/22/2017 12:51 P

## 2019-03-05 ENCOUNTER — HOSPITAL ENCOUNTER (EMERGENCY)
Dept: HOSPITAL 53 - M ED | Age: 39
LOS: 1 days | Discharge: HOME | End: 2019-03-06
Payer: MEDICAID

## 2019-03-05 VITALS — WEIGHT: 190.39 LBS | HEIGHT: 71 IN | BODY MASS INDEX: 26.65 KG/M2

## 2019-03-05 DIAGNOSIS — R00.1: ICD-10-CM

## 2019-03-05 DIAGNOSIS — I25.10: ICD-10-CM

## 2019-03-05 DIAGNOSIS — Z87.891: ICD-10-CM

## 2019-03-05 DIAGNOSIS — Z95.5: ICD-10-CM

## 2019-03-05 DIAGNOSIS — Z88.1: ICD-10-CM

## 2019-03-05 DIAGNOSIS — R07.9: Primary | ICD-10-CM

## 2019-03-05 DIAGNOSIS — F10.10: ICD-10-CM

## 2019-03-05 DIAGNOSIS — Z82.49: ICD-10-CM

## 2019-03-05 DIAGNOSIS — Z79.82: ICD-10-CM

## 2019-03-05 DIAGNOSIS — Z79.899: ICD-10-CM

## 2019-03-05 DIAGNOSIS — I10: ICD-10-CM

## 2019-03-05 LAB
BASOPHILS # BLD AUTO: 0 10^3/UL (ref 0–0.2)
BASOPHILS NFR BLD AUTO: 0.3 % (ref 0–1)
BUN SERPL-MCNC: 14 MG/DL (ref 7–18)
CALCIUM SERPL-MCNC: 8.9 MG/DL (ref 8.5–10.1)
CHLORIDE SERPL-SCNC: 104 MEQ/L (ref 98–107)
CK MB CFR.DF SERPL CALC: 1.19
CK MB CFR.DF SERPL CALC: 1.47
CK MB SERPL-MCNC: < 1 NG/ML (ref ?–3.6)
CK MB SERPL-MCNC: < 1 NG/ML (ref ?–3.6)
CK SERPL-CCNC: 68 U/L (ref 39–308)
CK SERPL-CCNC: 84 U/L (ref 39–308)
CO2 SERPL-SCNC: 29 MEQ/L (ref 21–32)
CREAT SERPL-MCNC: 1.01 MG/DL (ref 0.7–1.3)
EOSINOPHIL # BLD AUTO: 0.2 10^3/UL (ref 0–0.5)
EOSINOPHIL NFR BLD AUTO: 1.4 % (ref 0–3)
GFR SERPL CREATININE-BSD FRML MDRD: > 60 ML/MIN/{1.73_M2} (ref 60–?)
GLUCOSE SERPL-MCNC: 110 MG/DL (ref 70–100)
HCT VFR BLD AUTO: 46.5 % (ref 42–52)
HGB BLD-MCNC: 15.9 G/DL (ref 13.5–17.5)
LYMPHOCYTES # BLD AUTO: 4.5 10^3/UL (ref 1.5–4.5)
LYMPHOCYTES NFR BLD AUTO: 40.7 % (ref 24–44)
MCH RBC QN AUTO: 30.7 PG (ref 27–33)
MCHC RBC AUTO-ENTMCNC: 34.2 G/DL (ref 32–36.5)
MCV RBC AUTO: 89.8 FL (ref 80–96)
MONOCYTES # BLD AUTO: 1.1 10^3/UL (ref 0–0.8)
MONOCYTES NFR BLD AUTO: 10.1 % (ref 0–5)
NEUTROPHILS # BLD AUTO: 5.2 10^3/UL (ref 1.8–7.7)
NEUTROPHILS NFR BLD AUTO: 47.2 % (ref 36–66)
NT-PRO BNP: 115 PG/ML (ref ?–125)
PLATELET # BLD AUTO: 341 10^3/UL (ref 150–450)
POTASSIUM SERPL-SCNC: 4 MEQ/L (ref 3.5–5.1)
RBC # BLD AUTO: 5.18 10^6/UL (ref 4.3–6.1)
SODIUM SERPL-SCNC: 139 MEQ/L (ref 136–145)
TROPONIN I SERPL-MCNC: < 0.02 NG/ML (ref ?–0.1)
TROPONIN I SERPL-MCNC: < 0.02 NG/ML (ref ?–0.1)
WBC # BLD AUTO: 11.1 10^3/UL (ref 4–10)

## 2019-03-05 NOTE — REP
CHEST, SINGLE VIEW:

 

There is no evidence of acute infiltrate.

 

No pleural effusion is seen.

 

The heart is normal in size.

 

The mediastinal silhouette is unremarkable.

 

The visualized osseous structures are intact.

 

IMPRESSION:

 

No acute pulmonary disease.

 

 

 

 

Electronically Signed by

Don Cash MD 03/06/2019 08:29 P

## 2019-03-06 VITALS — SYSTOLIC BLOOD PRESSURE: 111 MMHG | DIASTOLIC BLOOD PRESSURE: 62 MMHG

## 2019-03-06 LAB
CK MB CFR.DF SERPL CALC: 1.56
CK MB SERPL-MCNC: < 1 NG/ML (ref ?–3.6)
CK SERPL-CCNC: 64 U/L (ref 39–308)
TROPONIN I SERPL-MCNC: < 0.02 NG/ML (ref ?–0.1)

## 2019-03-06 NOTE — ECGEPIP
Stationary ECG Study

                           [facility] - ED

                                       

                                       Test Date:    2019

Pat Name:     FADI PAPPAS              Department:   

Patient ID:   [id number]                 Room:         -

Gender:       M                        Technician:   ct

:          1980               Requested By: SHRUTHI PUGH

Order Number: [id number]     Reading MD:   Trey Grier

                                 Measurements

Intervals                              Axis          

Rate:         62                       P:            48

VT:           146                      QRS:          50

QRSD:         112                      T:            -22

QT:           438                                    

QTc:          448                                    

                           Interpretive Statements

SINUS RHYTHM

Nonspecific T wave abnormality

Similar to tracing done at 15:57 same day

Electronically Signed On 3-6-2019 9:17:17 EST by Trey Grier

## 2019-03-06 NOTE — ECGEPIP
Stationary ECG Study

                           [facility] - ED

                                       

                                       Test Date:    2019

Pat Name:     FADI PAPPAS              Department:   

Patient ID:   [id number]                 Room:         -

Gender:       M                        Technician:   gt

:          1980               Requested By: Chris FERNANDEZ

Order Number: [id number]     Reading MD:   Trey Grier

                                 Measurements

Intervals                              Axis          

Rate:         56                       P:            53

MN:           154                      QRS:          54

QRSD:         108                      T:            198

QT:           458                                    

QTc:          445                                    

                           Interpretive Statements

SINUS BRADYCARDIA WITH FREQUENT VENTRICULAR PREMATURE COMPLEXES IN A

BIGEMINAL 

PATTERN

Nonspecific T wave abnormality

 

Electronically Signed On 3-6-2019 9:35:03 EST by Trey Grier

## 2019-03-06 NOTE — ECGEPIP
Stationary ECG Study

                           [facility] - ED

                                       

                                       Test Date:    2019

Pat Name:     FADI PAPPAS              Department:   

Patient ID:   [id number]                 Room:         -

Gender:       M                        Technician:   CT

:          1980               Requested By: AARON VERA

Order Number: [id number]     Reading MD:   Trey Grier

                                 Measurements

Intervals                              Axis          

Rate:         68                       P:            46

VA:           171                      QRS:          46

QRSD:         104                      T:            -21

QT:           416                                    

QTc:          442                                    

                           Interpretive Statements

SINUS RHYTHM

NONSPECIFIC T-WAVE ABNORMALITY

Rate decreased from tracing done 17

Electronically Signed On 3-6-2019 9:06:29 EST by Trey Grier

## 2020-03-03 ENCOUNTER — HOSPITAL ENCOUNTER (OUTPATIENT)
Dept: HOSPITAL 53 - M SFHCPLAZ | Age: 40
End: 2020-03-03
Attending: PHYSICIAN ASSISTANT
Payer: COMMERCIAL

## 2020-03-03 DIAGNOSIS — Z13.29: ICD-10-CM

## 2020-03-03 DIAGNOSIS — Z13.1: ICD-10-CM

## 2020-03-03 DIAGNOSIS — Z00.00: Primary | ICD-10-CM

## 2020-03-03 LAB
25(OH)D3 SERPL-MCNC: 18.1 NG/ML (ref 30–100)
EST. AVERAGE GLUCOSE BLD GHB EST-MCNC: 128 MG/DL (ref 60–110)
T4 FREE SERPL-MCNC: 1.01 NG/DL (ref 0.76–1.46)
TSH SERPL DL<=0.005 MIU/L-ACNC: 1.31 UIU/ML (ref 0.36–3.74)

## 2025-01-28 ENCOUNTER — HOSPITAL ENCOUNTER (OUTPATIENT)
Dept: HOSPITAL 53 - M PLALAB | Age: 45
End: 2025-01-28
Attending: FAMILY MEDICINE
Payer: MEDICARE

## 2025-01-28 DIAGNOSIS — M79.10: ICD-10-CM

## 2025-01-28 DIAGNOSIS — I50.22: ICD-10-CM

## 2025-01-28 DIAGNOSIS — E78.2: Primary | ICD-10-CM

## 2025-01-28 DIAGNOSIS — Z79.899: ICD-10-CM

## 2025-01-28 LAB
ALBUMIN SERPL BCG-MCNC: 3.9 G/DL (ref 3.2–5.2)
ALP SERPL-CCNC: 64 U/L (ref 40–129)
ALT SERPL W P-5'-P-CCNC: 36 U/L (ref 7–40)
AST SERPL-CCNC: 17 U/L (ref ?–34)
BASOPHILS # BLD AUTO: 0 10^3/UL (ref 0–0.2)
BASOPHILS NFR BLD AUTO: 0.4 % (ref 0–1)
BILIRUB SERPL-MCNC: 0.9 MG/DL (ref 0.3–1.2)
BUN SERPL-MCNC: 20 MG/DL (ref 9–23)
CALCIUM SERPL-MCNC: 8.9 MG/DL (ref 8.5–10.1)
CHLORIDE SERPL-SCNC: 104 MMOL/L (ref 98–107)
CHOLEST SERPL-MCNC: 192 MG/DL (ref ?–200)
CHOLEST/HDLC SERPL: 2.94 {RATIO} (ref ?–5)
CK SERPL-CCNC: 142 U/L (ref 46–171)
CO2 SERPL-SCNC: 27 MMOL/L (ref 20–31)
CREAT SERPL-MCNC: 1.05 MG/DL (ref 0.7–1.3)
CRP SERPL-MCNC: 1.48 MG/DL (ref ?–1)
EOSINOPHIL # BLD AUTO: 0.1 10^3/UL (ref 0–0.5)
EOSINOPHIL NFR BLD AUTO: 1.5 % (ref 0–3)
EST. AVERAGE GLUCOSE BLD GHB EST-MCNC: 105 MG/DL (ref 60–110)
GFR SERPL CREATININE-BSD FRML MDRD: > 60 ML/MIN/{1.73_M2} (ref 60–?)
GLUCOSE SERPL-MCNC: 95 MG/DL (ref 60–100)
HCT VFR BLD AUTO: 43.1 % (ref 42–52)
HDLC SERPL-MCNC: 65.3 MG/DL (ref 40–?)
HGB BLD-MCNC: 14.5 G/DL (ref 13.5–17.5)
LDLC SERPL CALC-MCNC: 93.9 MG/DL (ref ?–100)
LYMPHOCYTES # BLD AUTO: 2.1 10^3/UL (ref 1.5–5)
LYMPHOCYTES NFR BLD AUTO: 21.4 % (ref 24–44)
MCH RBC QN AUTO: 31.3 PG (ref 27–33)
MCHC RBC AUTO-ENTMCNC: 33.6 G/DL (ref 32–36.5)
MCV RBC AUTO: 93.1 FL (ref 80–96)
MONOCYTES # BLD AUTO: 0.9 10^3/UL (ref 0–0.8)
MONOCYTES NFR BLD AUTO: 9.7 % (ref 2–8)
NEUTROPHILS # BLD AUTO: 6.4 10^3/UL (ref 1.5–8.5)
NEUTROPHILS NFR BLD AUTO: 66.7 % (ref 36–66)
NONHDLC SERPL-MCNC: 126.7 MG/DL
PLATELET # BLD AUTO: 290 10^3/UL (ref 150–450)
POTASSIUM SERPL-SCNC: 4.6 MMOL/L (ref 3.5–5.1)
PROT SERPL-MCNC: 7.2 G/DL (ref 5.7–8.2)
RBC # BLD AUTO: 4.63 10^6/UL (ref 4.3–6.1)
SODIUM SERPL-SCNC: 142 MMOL/L (ref 136–145)
TRIGL SERPL-MCNC: 164 MG/DL (ref ?–150)
WBC # BLD AUTO: 9.6 10^3/UL (ref 4–10)